# Patient Record
Sex: FEMALE | Race: WHITE | NOT HISPANIC OR LATINO | Employment: OTHER | URBAN - METROPOLITAN AREA
[De-identification: names, ages, dates, MRNs, and addresses within clinical notes are randomized per-mention and may not be internally consistent; named-entity substitution may affect disease eponyms.]

---

## 2017-01-18 ENCOUNTER — HOSPITAL ENCOUNTER (OUTPATIENT)
Dept: BONE DENSITY | Facility: HOSPITAL | Age: 72
Discharge: HOME | End: 2017-01-18
Attending: FAMILY MEDICINE

## 2017-01-18 DIAGNOSIS — Z78.0 POST-MENOPAUSAL: ICD-10-CM

## 2017-01-18 DIAGNOSIS — M85.80 OSTEOPENIA: ICD-10-CM

## 2018-04-24 ENCOUNTER — RECORDS - HEALTHEAST (OUTPATIENT)
Dept: LAB | Facility: CLINIC | Age: 73
End: 2018-04-24

## 2018-04-24 LAB
ALBUMIN SERPL-MCNC: 3.5 G/DL (ref 3.5–5)
ANION GAP SERPL CALCULATED.3IONS-SCNC: 9 MMOL/L (ref 5–18)
BUN SERPL-MCNC: 15 MG/DL (ref 8–28)
CALCIUM SERPL-MCNC: 9.7 MG/DL (ref 8.5–10.5)
CHLORIDE BLD-SCNC: 107 MMOL/L (ref 98–107)
CO2 SERPL-SCNC: 22 MMOL/L (ref 22–31)
CREAT SERPL-MCNC: 0.9 MG/DL (ref 0.6–1.1)
GFR SERPL CREATININE-BSD FRML MDRD: >60 ML/MIN/1.73M2
GLUCOSE BLD-MCNC: 90 MG/DL (ref 70–125)
PHOSPHATE SERPL-MCNC: 2.8 MG/DL (ref 2.5–4.5)
POTASSIUM BLD-SCNC: 4.4 MMOL/L (ref 3.5–5)
SODIUM SERPL-SCNC: 138 MMOL/L (ref 136–145)

## 2018-07-24 ENCOUNTER — HOSPITAL ENCOUNTER (OUTPATIENT)
Dept: MAMMOGRAPHY | Facility: CLINIC | Age: 73
Discharge: HOME | End: 2018-07-24
Attending: FAMILY MEDICINE

## 2018-07-24 DIAGNOSIS — Z12.31 VISIT FOR SCREENING MAMMOGRAM: ICD-10-CM

## 2018-12-07 ENCOUNTER — RECORDS - HEALTHEAST (OUTPATIENT)
Dept: LAB | Facility: CLINIC | Age: 73
End: 2018-12-07

## 2018-12-07 LAB
ALBUMIN SERPL-MCNC: 3.6 G/DL (ref 3.5–5)
ANION GAP SERPL CALCULATED.3IONS-SCNC: 10 MMOL/L (ref 5–18)
BUN SERPL-MCNC: 15 MG/DL (ref 8–28)
CALCIUM SERPL-MCNC: 9.7 MG/DL (ref 8.5–10.5)
CHLORIDE BLD-SCNC: 109 MMOL/L (ref 98–107)
CO2 SERPL-SCNC: 21 MMOL/L (ref 22–31)
CREAT SERPL-MCNC: 0.85 MG/DL (ref 0.6–1.1)
GFR SERPL CREATININE-BSD FRML MDRD: >60 ML/MIN/1.73M2
GLUCOSE BLD-MCNC: 94 MG/DL (ref 70–125)
PHOSPHATE SERPL-MCNC: 2.8 MG/DL (ref 2.5–4.5)
POTASSIUM BLD-SCNC: 4.2 MMOL/L (ref 3.5–5)
SODIUM SERPL-SCNC: 140 MMOL/L (ref 136–145)

## 2020-05-20 ASSESSMENT — MIFFLIN-ST. JEOR
SCORE: 1036.08
SCORE: 1008.51

## 2020-05-21 ENCOUNTER — SURGERY - HEALTHEAST (OUTPATIENT)
Dept: SURGERY | Facility: HOSPITAL | Age: 75
End: 2020-05-21

## 2020-05-21 ENCOUNTER — ANESTHESIA - HEALTHEAST (OUTPATIENT)
Dept: SURGERY | Facility: HOSPITAL | Age: 75
End: 2020-05-21

## 2020-05-21 ASSESSMENT — MIFFLIN-ST. JEOR: SCORE: 1036.08

## 2020-06-02 ENCOUNTER — COMMUNICATION - HEALTHEAST (OUTPATIENT)
Dept: SCHEDULING | Facility: CLINIC | Age: 75
End: 2020-06-02

## 2020-06-02 DIAGNOSIS — Z11.59 SCREENING FOR VIRAL DISEASE: ICD-10-CM

## 2020-06-04 ENCOUNTER — AMBULATORY - HEALTHEAST (OUTPATIENT)
Dept: LAB | Facility: CLINIC | Age: 75
End: 2020-06-04

## 2020-06-04 DIAGNOSIS — Z11.59 SCREENING FOR VIRAL DISEASE: ICD-10-CM

## 2020-06-06 ENCOUNTER — COMMUNICATION - HEALTHEAST (OUTPATIENT)
Dept: LAB | Facility: CLINIC | Age: 75
End: 2020-06-06

## 2020-06-08 ENCOUNTER — OFFICE VISIT - HEALTHEAST (OUTPATIENT)
Dept: SURGERY | Facility: CLINIC | Age: 75
End: 2020-06-08

## 2020-06-08 DIAGNOSIS — Z48.89 POSTOPERATIVE VISIT: ICD-10-CM

## 2021-02-04 ENCOUNTER — AMBULATORY - HEALTHEAST (OUTPATIENT)
Dept: NURSING | Facility: CLINIC | Age: 76
End: 2021-02-04

## 2021-02-25 ENCOUNTER — AMBULATORY - HEALTHEAST (OUTPATIENT)
Dept: NURSING | Facility: CLINIC | Age: 76
End: 2021-02-25

## 2021-05-25 ENCOUNTER — RECORDS - HEALTHEAST (OUTPATIENT)
Dept: ADMINISTRATIVE | Facility: CLINIC | Age: 76
End: 2021-05-25

## 2021-05-27 ENCOUNTER — RECORDS - HEALTHEAST (OUTPATIENT)
Dept: ADMINISTRATIVE | Facility: CLINIC | Age: 76
End: 2021-05-27

## 2021-05-28 ENCOUNTER — RECORDS - HEALTHEAST (OUTPATIENT)
Dept: ADMINISTRATIVE | Facility: CLINIC | Age: 76
End: 2021-05-28

## 2021-05-30 ENCOUNTER — RECORDS - HEALTHEAST (OUTPATIENT)
Dept: ADMINISTRATIVE | Facility: CLINIC | Age: 76
End: 2021-05-30

## 2021-06-02 ENCOUNTER — RECORDS - HEALTHEAST (OUTPATIENT)
Dept: ADMINISTRATIVE | Facility: CLINIC | Age: 76
End: 2021-06-02

## 2021-06-04 VITALS — WEIGHT: 140.1 LBS | HEIGHT: 62 IN | BODY MASS INDEX: 25.78 KG/M2

## 2021-06-05 ENCOUNTER — RECORDS - HEALTHEAST (OUTPATIENT)
Dept: SCHEDULING | Facility: CLINIC | Age: 76
End: 2021-06-05

## 2021-06-05 DIAGNOSIS — Z87.39: ICD-10-CM

## 2021-06-08 NOTE — ANESTHESIA POSTPROCEDURE EVALUATION
Patient: Mary J Bruton  Procedure(s):  EXPLORATORY LAPAROTOMY WITH LYSIS OF ADHESIONS  Anesthesia type: general    Patient location: PACU  Last vitals:   Vitals Value Taken Time   /79 5/21/2020  3:10 AM   Temp  5/21/2020  3:20 AM   Pulse 99 5/21/2020  3:19 AM   Resp 16 5/21/2020  3:19 AM   SpO2 98 % 5/21/2020  3:19 AM   Vitals shown include unvalidated device data.  Post vital signs: stable  Level of consciousness: awake and responds to simple questions  Post-anesthesia pain: pain controlled  Post-anesthesia nausea and vomiting: no  Pulmonary: unassisted, return to baseline  Cardiovascular: stable and blood pressure at baseline  Hydration: adequate  Anesthetic events: no    QCDR Measures:  ASA# 11 - Randee-op Cardiac Arrest: ASA11B - Patient did NOT experience unanticipated cardiac arrest  ASA# 12 - Randee-op Mortality Rate: ASA12B - Patient did NOT die  ASA# 13 - PACU Re-Intubation Rate: ASA13B - Patient did NOT require a new airway mgmt  ASA# 10 - Composite Anes Safety: ASA10A - No serious adverse event    Additional Notes:

## 2021-06-08 NOTE — PROGRESS NOTES
HPI: Pt is here for follow up exploratory laparotomy with KETURAH with Dr. George on 5/21/2020.   she is doing well.  Pain is well controlled.  No difficulties with the surgical wound/wounds.  she is eating well and denies fever and chills.         There were no vitals taken for this visit.    EXAM:  GENERAL:Appears well  ABDOMEN:  Soft, +BS  SURGICAL WOUNDS:  Incisions healing well, no enduration or drainage. Staples removed without incident. Patient tolerated well      Assessment/Plan: Doing well after surgery and should follow up as needed.    Gabriela Jacobson , [facility]

## 2021-06-08 NOTE — ANESTHESIA CARE TRANSFER NOTE
Last vitals:   Vitals:    05/21/20 0300   BP: 171/84   Pulse: 95   Resp: 21   Temp:    SpO2: 99%     Patient's level of consciousness is awake  Spontaneous respirations: yes  Maintains airway independently: yes  Dentition unchanged: yes  Oropharynx: oropharynx clear of all foreign objects    QCDR Measures:  ASA# 20 - Surgical Safety Checklist: WHO surgical safety checklist completed prior to induction    PQRS# 430 - Adult PONV Prevention: 4558F - Pt received => 2 anti-emetic agents (different classes) preop & intraop  ASA# 8 - Peds PONV Prevention: NA - Not pediatric patient, not GA or 2 or more risk factors NOT present  PQRS# 424 - Randee-op Temp Management: 4559F - At least one body temp DOCUMENTED => 35.5C or 95.9F within required timeframe  PQRS# 426 - PACU Transfer Protocol: - Transfer of care checklist used  ASA# 14 - Acute Post-op Pain: ASA14A - Patient experienced pain >= 7 out of 10

## 2021-06-08 NOTE — TELEPHONE ENCOUNTER
"Who is calling:  Patient   Reason for Call:  Suspected covid  Date of last appointment with primary care:   Okay to leave a detailed message: Yes    Patient is calling requesting COVID serologic antibody testing.  NOTE: Serologic testing is a blood test for 'antibodies' which are made at 10-14 days after you have had symptoms of COVID or were exposed and had an asymptomatic infection.  This does NOT test you for 'active' infection or tell you if you are contagious.    Are you a healthcare worker?  No  Do you currently have a cough, fever, body aches, shortness of breath or difficulty breathing?   No  Did you previously have cough, fever, body aches, shortness of breath, or difficulty breathing that have now resolved? Has had previous covid symptoms.   Symptoms began 120 days ago.  Symptoms started > 14 days ago. Lab order placed per SARS-CoV-2 Serology test Standing Order using indication \"Previously symptomatic >14d since onset, currently asymptomatic\" and diagnosis code \"Screening for viral disease\" (Z11.59)          The patient was informed: \"Testing is limited each day and it may take time for testing to be available to everyone who has called. You will receive a call within 48-72 hours to schedule the serology testing. Please confirm the best number to reach you is [phone]. If you have any questions about scheduling, call [phone].\"     "

## 2021-06-08 NOTE — ANESTHESIA PREPROCEDURE EVALUATION
Anesthesia Evaluation      Patient summary reviewed   No history of anesthetic complications     Airway    Pulmonary - negative ROS                          Cardiovascular - negative ROS   Neuro/Psych - negative ROS     Endo/Other - negative ROS      GI/Hepatic/Renal - negative ROS      Other findings: Results for BRUTON, MARY J (MRN [id number]) as of 5/21/2020 01:05    5/21/2020 00:17  Sodium: 141  Potassium: 4.4  Chloride: 109 (H)  CO2: 21 (L)  Anion Gap, Calculation: 11  BUN: 16  Creatinine: 0.79  GFR MDRD Af Amer: >60  GFR MDRD Non Af Amer: >60  Calcium: 9.3  Magnesium: 1.7 (L)  Glucose: 161 (H)  Lactic Acid: 3.1 (H)  Results for BRUTON, MARY J (MRN [id number]) as of 5/21/2020 01:05    5/21/2020 00:17  INR: 1.02  WBC: 24.1 (H)  RBC: 5.23  Hemoglobin: 15.7  Hematocrit: 46.7  MCV: 89  MCH: 30.0  MCHC: 33.6  RDW: 12.8  Platelets: 294        Dental                         Anesthesia Plan  Planned anesthetic: general endotracheal  GAETT  RSI  Will need to treat as COVID PUI as no COVID test performed  Antiemetics  2 IVs  Magnesium IV intra op  Ketamine after induction  Need to check pre op ECG  ASA 2 - emergent   Induction: intravenous   Anesthetic plan and risks discussed with: patient  Anesthesia plan special considerations: rapid sequence induction, antiemetics,   Post-op plan: routine recovery

## 2021-06-16 PROBLEM — K56.609 SBO (SMALL BOWEL OBSTRUCTION) (H): Status: ACTIVE | Noted: 2020-05-20

## 2021-06-20 ENCOUNTER — HEALTH MAINTENANCE LETTER (OUTPATIENT)
Age: 76
End: 2021-06-20

## 2021-06-20 NOTE — LETTER
Letter by Eugenie Jack RN at      Author: Euegnie Jack RN Service: -- Author Type: --    Filed:  Encounter Date: 6/6/2020 Status: (Other)       6/6/2020        Mary J Bruton  [address] [address]    COVID-19 Antibody Screen   Date Value Ref Range Status   06/04/2020 Negative  Final     Comment:     No COVID-19 antibodies detected.  Patients within 10 days of symptom onset for  COVID-19 may not produce sufficient levels of detectable antibodies.  Immunocompromised COVID-19 patients may take longer to develop antibodies.       You have tested NEGATIVE for COVID-19 antibodies. This suggests you have not had or been exposed to COVID-19. But it does not mean that for sure.    The test finds antibodies in most people 10 days after they get sick. For some people, it takes longer than 10 days for antibodies to show up. Others may never show antibodies against COVID-19, especially if they have weak immune systems.    If you have COVID-19 symptoms now, please stay home and away from others.     Your current symptoms may or may not be COVID-19.     What is antibody testing?  This is a kind of blood test. We take a small sample of your blood, and then test it for something called antibodies.   Your body makes antibodies to fight infection. If your blood has antibodies for a certain germ, it means youve been infected with that germ in the past.   Sometimes, antibodies stay in your body for years after youve had the infection. They can be there even if the germ didnt make you sick. They are a sign that your body fought off the infection.  Will this test find antibodies in everyone whos had COVID-19?  No. The test finds antibodies in most people 10 days after they get sick. For some people, it takes longer than 10 days for antibodies to show up. Others may never show antibodies against COVID-19, especially if they have weak immune systems.  What are the signs of COVID-19?  Signs of COVID-19 can appear from  2 to 14 days (up to 2 weeks) after youre infected. Some people have no symptoms or only mild symptoms. Others get very sick. The most common symptoms are:      Cough    Shortness of breath or trouble breathing    Or at least 2 of these symptoms:      Fever    Chills    Repeated shaking with chills    Muscle pain    Headache    Sore throat    Losing your sense of taste or smell    You may have other symptoms. Please contact your doctor or clinic for any symptoms that worry you.    Where can I get more information?     To learn the [facility] guidelines for staying home, please visit the [place] Department of Health website at [email or web address]/[email or web address].html    To learn more about COVID-19 and how to care for yourself at home, please visit the CDC website at https://www.cdc.gov/coronavirus/2019-ncov/about/steps-when-sick.html    For more options for care at [facility], please visit our website at [email or web address]    [facility] ([facility]) COVID-19 Hotline:  [phone]

## 2021-07-13 ENCOUNTER — RECORDS - HEALTHEAST (OUTPATIENT)
Dept: ADMINISTRATIVE | Facility: CLINIC | Age: 76
End: 2021-07-13

## 2021-09-21 ENCOUNTER — NURSE TRIAGE (OUTPATIENT)
Dept: NURSING | Facility: CLINIC | Age: 76
End: 2021-09-21

## 2021-09-21 NOTE — TELEPHONE ENCOUNTER
Patient asking for new covid immunization card. Hers is not readable and got it at [facility].  She would have to go to clinic and present it and see if she can trade it for new card.    Baylee Greer RN  [facility] Nurse Advisor

## 2021-10-11 ENCOUNTER — HEALTH MAINTENANCE LETTER (OUTPATIENT)
Age: 76
End: 2021-10-11

## 2022-07-17 ENCOUNTER — HEALTH MAINTENANCE LETTER (OUTPATIENT)
Age: 77
End: 2022-07-17

## 2022-08-08 ENCOUNTER — TRANSCRIBE ORDERS (OUTPATIENT)
Dept: OTHER | Age: 77
End: 2022-08-08

## 2022-08-08 ENCOUNTER — MEDICAL CORRESPONDENCE (OUTPATIENT)
Dept: HEALTH INFORMATION MANAGEMENT | Facility: CLINIC | Age: 77
End: 2022-08-08

## 2022-08-08 DIAGNOSIS — K43.2 INCISIONAL HERNIA, WITHOUT OBSTRUCTION OR GANGRENE: Primary | ICD-10-CM

## 2022-08-19 ENCOUNTER — OFFICE VISIT (OUTPATIENT)
Dept: SURGERY | Facility: CLINIC | Age: 77
End: 2022-08-19
Payer: COMMERCIAL

## 2022-08-19 VITALS — WEIGHT: 130.5 LBS | HEIGHT: 62 IN | BODY MASS INDEX: 24.01 KG/M2

## 2022-08-19 DIAGNOSIS — K43.2 INCISIONAL HERNIA, WITHOUT OBSTRUCTION OR GANGRENE: ICD-10-CM

## 2022-08-19 PROCEDURE — 99213 OFFICE O/P EST LOW 20 MIN: CPT | Performed by: SPECIALIST

## 2022-08-19 RX ORDER — CEFAZOLIN SODIUM/WATER 2 G/20 ML
2 SYRINGE (ML) INTRAVENOUS
Status: CANCELLED | OUTPATIENT
Start: 2022-10-27

## 2022-08-19 RX ORDER — CEFAZOLIN SODIUM/WATER 2 G/20 ML
2 SYRINGE (ML) INTRAVENOUS SEE ADMIN INSTRUCTIONS
Status: CANCELLED | OUTPATIENT
Start: 2022-10-27

## 2022-08-19 RX ORDER — LOSARTAN POTASSIUM 50 MG/1
50 TABLET ORAL DAILY
COMMUNITY
Start: 2022-06-27

## 2022-08-19 RX ORDER — ACETAMINOPHEN 325 MG/1
975 TABLET ORAL ONCE
Status: CANCELLED | OUTPATIENT
Start: 2022-10-27 | End: 2022-08-19

## 2022-08-19 RX ORDER — SPIRONOLACTONE 25 MG/1
50 TABLET ORAL DAILY
COMMUNITY
Start: 2022-08-13

## 2022-08-19 NOTE — LETTER
"    8/19/2022         RE: Mary J Bruton  [address] [address]        Dear Colleague,    Thank you for referring your patient, Mary J Bruton, to the [facility] [place]. Please see a copy of my visit note below.          HPI:  This is a 77 year old female here today with concerns of pain and bulging in her ventral and incisional area. She has noted this for the past a a few  month. The symptoms have progressed and increased over this time. She comes in for evaluation secondary to the hernia causing enough issues to bother them with daily activities or chores.    Allergies:Atenolol and Lisinopril    History reviewed. No pertinent past medical history.    Past Surgical History:   Procedure Laterality Date     LAPAROTOMY EXPLORATORY N/A 5/21/2020    Procedure: EXPLORATORY LAPAROTOMY WITH LYSIS OF ADHESIONS;  Surgeon: Roman George MD;  Location: [facility];  Service: General       CURRENT MEDS:      History reviewed. No pertinent family history.     reports that she has quit smoking. She has never used smokeless tobacco. She reports current alcohol use of about 1.0 standard drink of alcohol per week. She reports that she does not use drugs.    Review of Systems - Negative except history of exoplore surgery in the past. Otherwise twelve system of review is negative.      Vitals:    08/19/22 1332   Weight: 59.2 kg (130 lb 8 oz)   Height: 1.575 m (5' 2\")       Body mass index is 23.87 kg/m .    EXAM:  General: NAD   HEENT: normocephalic, PERRLA and EOMS intact  Mounth: Mucus membranes moist  Neck: Supple  Chest: Clear to auscultation bilaterally  CV: RRR  ABD: Soft nontender and nondistended, ventral hernia and incisional hernia, reducible  EXT: Warm, pulses intact,   Neuro: Alert and oriented x3  Back: no CVA tenderness      IMAGES:     none    Assessment/Plan: Pt with a ventral hernia and incisional hernia. I discussed this at length with She.  I " went over conservative management as well as surgical treatment of this.. I would plan on doing this via anrobotic  approach with possible use of mesh. I went over the small risks of surgery including but not limited to bleeding and infection, anesthesia, recurrence rates and nerve injury. I discussed the expected recovery time as well. Will get this scheduled. She will contact us to have this scheduled.      Roman George MD ,MD Roman George MD  General Surgery [phone]  Vascular Surgery [phone]          Again, thank you for allowing me to participate in the care of your patient.        Sincerely,        Roman George MD

## 2022-08-29 NOTE — PROGRESS NOTES
"      HPI:  This is a 77 year old female here today with concerns of pain and bulging in her ventral and incisional area. She has noted this for the past a a few  month. The symptoms have progressed and increased over this time. She comes in for evaluation secondary to the hernia causing enough issues to bother them with daily activities or chores.    Allergies:Atenolol and Lisinopril    History reviewed. No pertinent past medical history.    Past Surgical History:   Procedure Laterality Date     LAPAROTOMY EXPLORATORY N/A 5/21/2020    Procedure: EXPLORATORY LAPAROTOMY WITH LYSIS OF ADHESIONS;  Surgeon: Roman George MD;  [facility];  Service: General       CURRENT MEDS:      History reviewed. No pertinent family history.     reports that she has quit smoking. She has never used smokeless tobacco. She reports current alcohol use of about 1.0 standard drink of alcohol per week. She reports that she does not use drugs.    Review of Systems - Negative except history of exoplore surgery in the past. Otherwise twelve system of review is negative.      Vitals:    08/19/22 1332   Weight: 59.2 kg (130 lb 8 oz)   Height: 1.575 m (5' 2\")       Body mass index is 23.87 kg/m .    EXAM:  General: NAD   HEENT: normocephalic, PERRLA and EOMS intact  Mounth: Mucus membranes moist  Neck: Supple  Chest: Clear to auscultation bilaterally  CV: RRR  ABD: Soft nontender and nondistended, ventral hernia and incisional hernia, reducible  EXT: Warm, pulses intact,   Neuro: Alert and oriented x3  Back: no CVA tenderness      IMAGES:     none    Assessment/Plan: Pt with a ventral hernia and incisional hernia. I discussed this at length with She.  I went over conservative management as well as surgical treatment of this.. I would plan on doing this via anrobotic  approach with possible use of mesh. I went over the small risks of surgery including but not limited to bleeding and infection, anesthesia, recurrence rates and " nerve injury. I discussed the expected recovery time as well. Will get this scheduled. She will contact us to have this scheduled.      Roman George MD ,MD Roman George MD  General Surgery [phone]  Vascular Surgery [phone]

## 2022-09-25 ENCOUNTER — HEALTH MAINTENANCE LETTER (OUTPATIENT)
Age: 77
End: 2022-09-25

## 2022-10-17 ENCOUNTER — TRANSFERRED RECORDS (OUTPATIENT)
Dept: HEALTH INFORMATION MANAGEMENT | Facility: CLINIC | Age: 77
End: 2022-10-17

## 2022-10-18 ENCOUNTER — TRANSFERRED RECORDS (OUTPATIENT)
Dept: HEALTH INFORMATION MANAGEMENT | Facility: CLINIC | Age: 77
End: 2022-10-18

## 2022-10-27 ENCOUNTER — HOSPITAL ENCOUNTER (OUTPATIENT)
Facility: HOSPITAL | Age: 77
Discharge: HOME | End: 2022-10-27
Attending: SPECIALIST | Admitting: SPECIALIST
Payer: COMMERCIAL

## 2022-10-27 ENCOUNTER — ANESTHESIA EVENT (OUTPATIENT)
Dept: SURGERY | Facility: HOSPITAL | Age: 77
End: 2022-10-27
Payer: COMMERCIAL

## 2022-10-27 ENCOUNTER — ANESTHESIA (OUTPATIENT)
Dept: SURGERY | Facility: HOSPITAL | Age: 77
End: 2022-10-27
Payer: COMMERCIAL

## 2022-10-27 VITALS
HEIGHT: 62 IN | SYSTOLIC BLOOD PRESSURE: 126 MMHG | HEART RATE: 64 BPM | BODY MASS INDEX: 24.29 KG/M2 | WEIGHT: 132 LBS | RESPIRATION RATE: 9 BRPM | DIASTOLIC BLOOD PRESSURE: 62 MMHG | OXYGEN SATURATION: 92 % | TEMPERATURE: 98.5 F

## 2022-10-27 DIAGNOSIS — K43.9 VENTRAL HERNIA WITHOUT OBSTRUCTION OR GANGRENE: Primary | ICD-10-CM

## 2022-10-27 PROCEDURE — 250N000013 HC RX MED GY IP 250 OP 250 PS 637: Performed by: SPECIALIST

## 2022-10-27 PROCEDURE — 250N000009 HC RX 250: Performed by: ANESTHESIOLOGY

## 2022-10-27 PROCEDURE — 250N000013 HC RX MED GY IP 250 OP 250 PS 637: Performed by: ANESTHESIOLOGY

## 2022-10-27 PROCEDURE — 250N000011 HC RX IP 250 OP 636

## 2022-10-27 PROCEDURE — 272N000001 HC OR GENERAL SUPPLY STERILE: Performed by: SPECIALIST

## 2022-10-27 PROCEDURE — C1781 MESH (IMPLANTABLE): HCPCS | Performed by: SPECIALIST

## 2022-10-27 PROCEDURE — 2894A PR VOIDCORRECT: CPT | Performed by: SPECIALIST

## 2022-10-27 PROCEDURE — 250N000011 HC RX IP 250 OP 636: Performed by: ANESTHESIOLOGY

## 2022-10-27 PROCEDURE — 250N000011 HC RX IP 250 OP 636: Performed by: SPECIALIST

## 2022-10-27 PROCEDURE — S2900 ROBOTIC SURGICAL SYSTEM: HCPCS | Performed by: SPECIALIST

## 2022-10-27 PROCEDURE — 250N000025 HC SEVOFLURANE, PER MIN: Performed by: SPECIALIST

## 2022-10-27 PROCEDURE — 250N000009 HC RX 250

## 2022-10-27 PROCEDURE — 258N000003 HC RX IP 258 OP 636: Performed by: ANESTHESIOLOGY

## 2022-10-27 PROCEDURE — 49654 PR LAP INCISIONAL HERNIA REPAIR: CPT | Mod: 51 | Performed by: SPECIALIST

## 2022-10-27 PROCEDURE — 999N000141 HC STATISTIC PRE-PROCEDURE NURSING ASSESSMENT: Performed by: SPECIALIST

## 2022-10-27 PROCEDURE — 710N000009 HC RECOVERY PHASE 1, LEVEL 1, PER MIN: Performed by: SPECIALIST

## 2022-10-27 PROCEDURE — 370N000017 HC ANESTHESIA TECHNICAL FEE, PER MIN: Performed by: SPECIALIST

## 2022-10-27 PROCEDURE — 710N000012 HC RECOVERY PHASE 2, PER MINUTE: Performed by: SPECIALIST

## 2022-10-27 PROCEDURE — 250N000009 HC RX 250: Performed by: SPECIALIST

## 2022-10-27 PROCEDURE — 360N000080 HC SURGERY LEVEL 7, PER MIN: Performed by: SPECIALIST

## 2022-10-27 DEVICE — SELF-GRIPPING POLYESTER MESH,POLYESTER WITH POLYLACTIC ACID GRIPS
Type: IMPLANTABLE DEVICE | Site: ABDOMEN | Status: FUNCTIONAL
Brand: PROGRIP

## 2022-10-27 RX ORDER — ACETAMINOPHEN 325 MG/1
975 TABLET ORAL ONCE
Status: COMPLETED | OUTPATIENT
Start: 2022-10-27 | End: 2022-10-27

## 2022-10-27 RX ORDER — OXYCODONE AND ACETAMINOPHEN 5; 325 MG/1; MG/1
1 TABLET ORAL EVERY 6 HOURS PRN
Qty: 12 TABLET | Refills: 0 | Status: SHIPPED | OUTPATIENT
Start: 2022-10-27 | End: 2022-10-30

## 2022-10-27 RX ORDER — FENTANYL CITRATE 50 UG/ML
25 INJECTION, SOLUTION INTRAMUSCULAR; INTRAVENOUS
Status: DISCONTINUED | OUTPATIENT
Start: 2022-10-27 | End: 2022-10-27 | Stop reason: HOSPADM

## 2022-10-27 RX ORDER — NALOXONE HYDROCHLORIDE 0.4 MG/ML
0.2 INJECTION, SOLUTION INTRAMUSCULAR; INTRAVENOUS; SUBCUTANEOUS
Status: DISCONTINUED | OUTPATIENT
Start: 2022-10-27 | End: 2022-10-27 | Stop reason: HOSPADM

## 2022-10-27 RX ORDER — BUPIVACAINE HYDROCHLORIDE 2.5 MG/ML
INJECTION, SOLUTION EPIDURAL; INFILTRATION; INTRACAUDAL
Status: DISCONTINUED | OUTPATIENT
Start: 2022-10-27 | End: 2022-10-27

## 2022-10-27 RX ORDER — ONDANSETRON 2 MG/ML
4 INJECTION INTRAMUSCULAR; INTRAVENOUS EVERY 30 MIN PRN
Status: DISCONTINUED | OUTPATIENT
Start: 2022-10-27 | End: 2022-10-27 | Stop reason: HOSPADM

## 2022-10-27 RX ORDER — SODIUM CHLORIDE, SODIUM LACTATE, POTASSIUM CHLORIDE, CALCIUM CHLORIDE 600; 310; 30; 20 MG/100ML; MG/100ML; MG/100ML; MG/100ML
INJECTION, SOLUTION INTRAVENOUS CONTINUOUS
Status: DISCONTINUED | OUTPATIENT
Start: 2022-10-27 | End: 2022-10-27 | Stop reason: HOSPADM

## 2022-10-27 RX ORDER — NALOXONE HYDROCHLORIDE 0.4 MG/ML
0.4 INJECTION, SOLUTION INTRAMUSCULAR; INTRAVENOUS; SUBCUTANEOUS
Status: DISCONTINUED | OUTPATIENT
Start: 2022-10-27 | End: 2022-10-27 | Stop reason: HOSPADM

## 2022-10-27 RX ORDER — FENTANYL CITRATE 50 UG/ML
INJECTION, SOLUTION INTRAMUSCULAR; INTRAVENOUS PRN
Status: DISCONTINUED | OUTPATIENT
Start: 2022-10-27 | End: 2022-10-27

## 2022-10-27 RX ORDER — DEXAMETHASONE SODIUM PHOSPHATE 10 MG/ML
INJECTION, SOLUTION INTRAMUSCULAR; INTRAVENOUS PRN
Status: DISCONTINUED | OUTPATIENT
Start: 2022-10-27 | End: 2022-10-27

## 2022-10-27 RX ORDER — ONDANSETRON 4 MG/1
4 TABLET, ORALLY DISINTEGRATING ORAL EVERY 30 MIN PRN
Status: DISCONTINUED | OUTPATIENT
Start: 2022-10-27 | End: 2022-10-27 | Stop reason: HOSPADM

## 2022-10-27 RX ORDER — PROPOFOL 10 MG/ML
INJECTION, EMULSION INTRAVENOUS CONTINUOUS PRN
Status: DISCONTINUED | OUTPATIENT
Start: 2022-10-27 | End: 2022-10-27

## 2022-10-27 RX ORDER — CEFAZOLIN SODIUM/WATER 2 G/20 ML
2 SYRINGE (ML) INTRAVENOUS
Status: COMPLETED | OUTPATIENT
Start: 2022-10-27 | End: 2022-10-27

## 2022-10-27 RX ORDER — LIDOCAINE 40 MG/G
CREAM TOPICAL
Status: DISCONTINUED | OUTPATIENT
Start: 2022-10-27 | End: 2022-10-27 | Stop reason: HOSPADM

## 2022-10-27 RX ORDER — GLYCOPYRROLATE 0.2 MG/ML
INJECTION, SOLUTION INTRAMUSCULAR; INTRAVENOUS PRN
Status: DISCONTINUED | OUTPATIENT
Start: 2022-10-27 | End: 2022-10-27

## 2022-10-27 RX ORDER — MEPERIDINE HYDROCHLORIDE 25 MG/ML
12.5 INJECTION INTRAMUSCULAR; INTRAVENOUS; SUBCUTANEOUS
Status: DISCONTINUED | OUTPATIENT
Start: 2022-10-27 | End: 2022-10-27 | Stop reason: HOSPADM

## 2022-10-27 RX ORDER — KETOROLAC TROMETHAMINE 30 MG/ML
15 INJECTION, SOLUTION INTRAMUSCULAR; INTRAVENOUS EVERY 6 HOURS PRN
Status: DISCONTINUED | OUTPATIENT
Start: 2022-10-27 | End: 2022-10-27 | Stop reason: HOSPADM

## 2022-10-27 RX ORDER — ONDANSETRON 2 MG/ML
INJECTION INTRAMUSCULAR; INTRAVENOUS PRN
Status: DISCONTINUED | OUTPATIENT
Start: 2022-10-27 | End: 2022-10-27

## 2022-10-27 RX ORDER — PROPOFOL 10 MG/ML
INJECTION, EMULSION INTRAVENOUS PRN
Status: DISCONTINUED | OUTPATIENT
Start: 2022-10-27 | End: 2022-10-27

## 2022-10-27 RX ORDER — CEFAZOLIN SODIUM/WATER 2 G/20 ML
2 SYRINGE (ML) INTRAVENOUS SEE ADMIN INSTRUCTIONS
Status: DISCONTINUED | OUTPATIENT
Start: 2022-10-27 | End: 2022-10-27 | Stop reason: HOSPADM

## 2022-10-27 RX ORDER — FENTANYL CITRATE 50 UG/ML
25 INJECTION, SOLUTION INTRAMUSCULAR; INTRAVENOUS EVERY 5 MIN PRN
Status: DISCONTINUED | OUTPATIENT
Start: 2022-10-27 | End: 2022-10-27 | Stop reason: HOSPADM

## 2022-10-27 RX ORDER — OXYCODONE HYDROCHLORIDE 5 MG/1
5 TABLET ORAL EVERY 4 HOURS PRN
Status: DISCONTINUED | OUTPATIENT
Start: 2022-10-27 | End: 2022-10-27 | Stop reason: HOSPADM

## 2022-10-27 RX ADMIN — ONDANSETRON 4 MG: 2 INJECTION INTRAMUSCULAR; INTRAVENOUS at 12:29

## 2022-10-27 RX ADMIN — ROCURONIUM BROMIDE 10 MG: 50 INJECTION, SOLUTION INTRAVENOUS at 11:15

## 2022-10-27 RX ADMIN — ROCURONIUM BROMIDE 20 MG: 50 INJECTION, SOLUTION INTRAVENOUS at 11:58

## 2022-10-27 RX ADMIN — HYDROMORPHONE HYDROCHLORIDE 0.2 MG: 1 INJECTION, SOLUTION INTRAMUSCULAR; INTRAVENOUS; SUBCUTANEOUS at 13:35

## 2022-10-27 RX ADMIN — PROPOFOL 40 MCG/KG/MIN: 10 INJECTION, EMULSION INTRAVENOUS at 11:08

## 2022-10-27 RX ADMIN — HYDROMORPHONE HYDROCHLORIDE 0.2 MG: 1 INJECTION, SOLUTION INTRAMUSCULAR; INTRAVENOUS; SUBCUTANEOUS at 13:50

## 2022-10-27 RX ADMIN — FENTANYL CITRATE 25 MCG: 50 INJECTION, SOLUTION INTRAMUSCULAR; INTRAVENOUS at 12:09

## 2022-10-27 RX ADMIN — SODIUM CHLORIDE, POTASSIUM CHLORIDE, SODIUM LACTATE AND CALCIUM CHLORIDE: 600; 310; 30; 20 INJECTION, SOLUTION INTRAVENOUS at 11:10

## 2022-10-27 RX ADMIN — GLYCOPYRROLATE 0.2 MG: 0.2 INJECTION, SOLUTION INTRAMUSCULAR; INTRAVENOUS at 10:07

## 2022-10-27 RX ADMIN — HYDROMORPHONE HYDROCHLORIDE 0.2 MG: 1 INJECTION, SOLUTION INTRAMUSCULAR; INTRAVENOUS; SUBCUTANEOUS at 14:00

## 2022-10-27 RX ADMIN — FENTANYL CITRATE 50 MCG: 50 INJECTION, SOLUTION INTRAMUSCULAR; INTRAVENOUS at 12:56

## 2022-10-27 RX ADMIN — FENTANYL CITRATE 25 MCG: 50 INJECTION, SOLUTION INTRAMUSCULAR; INTRAVENOUS at 12:21

## 2022-10-27 RX ADMIN — SODIUM CHLORIDE, POTASSIUM CHLORIDE, SODIUM LACTATE AND CALCIUM CHLORIDE: 600; 310; 30; 20 INJECTION, SOLUTION INTRAVENOUS at 09:20

## 2022-10-27 RX ADMIN — Medication 2 G: at 10:04

## 2022-10-27 RX ADMIN — PROPOFOL 160 MG: 10 INJECTION, EMULSION INTRAVENOUS at 10:07

## 2022-10-27 RX ADMIN — ACETAMINOPHEN 975 MG: 325 TABLET, FILM COATED ORAL at 08:49

## 2022-10-27 RX ADMIN — HYDROMORPHONE HYDROCHLORIDE 0.25 MG: 1 INJECTION, SOLUTION INTRAMUSCULAR; INTRAVENOUS; SUBCUTANEOUS at 10:31

## 2022-10-27 RX ADMIN — GLYCOPYRROLATE 0.1 MG: 0.2 INJECTION, SOLUTION INTRAMUSCULAR; INTRAVENOUS at 12:31

## 2022-10-27 RX ADMIN — FENTANYL CITRATE 50 MCG: 50 INJECTION, SOLUTION INTRAMUSCULAR; INTRAVENOUS at 10:07

## 2022-10-27 RX ADMIN — KETOROLAC TROMETHAMINE 15 MG: 30 INJECTION, SOLUTION INTRAMUSCULAR at 14:46

## 2022-10-27 RX ADMIN — HYDROMORPHONE HYDROCHLORIDE 0.25 MG: 1 INJECTION, SOLUTION INTRAMUSCULAR; INTRAVENOUS; SUBCUTANEOUS at 12:58

## 2022-10-27 RX ADMIN — HYDROMORPHONE HYDROCHLORIDE 0.25 MG: 1 INJECTION, SOLUTION INTRAMUSCULAR; INTRAVENOUS; SUBCUTANEOUS at 10:46

## 2022-10-27 RX ADMIN — BUPIVACAINE HYDROCHLORIDE 30 ML: 2.5 INJECTION, SOLUTION EPIDURAL; INFILTRATION; INTRACAUDAL at 10:10

## 2022-10-27 RX ADMIN — ROCURONIUM BROMIDE 10 MG: 50 INJECTION, SOLUTION INTRAVENOUS at 10:45

## 2022-10-27 RX ADMIN — SUGAMMADEX 120 MG: 100 INJECTION, SOLUTION INTRAVENOUS at 12:36

## 2022-10-27 RX ADMIN — Medication 100 MG: at 10:07

## 2022-10-27 RX ADMIN — HYDROMORPHONE HYDROCHLORIDE 0.2 MG: 1 INJECTION, SOLUTION INTRAMUSCULAR; INTRAVENOUS; SUBCUTANEOUS at 13:17

## 2022-10-27 RX ADMIN — SODIUM CHLORIDE, POTASSIUM CHLORIDE, SODIUM LACTATE AND CALCIUM CHLORIDE: 600; 310; 30; 20 INJECTION, SOLUTION INTRAVENOUS at 13:34

## 2022-10-27 RX ADMIN — ACETAMINOPHEN 975 MG: 325 TABLET, FILM COATED ORAL at 14:41

## 2022-10-27 RX ADMIN — DEXAMETHASONE SODIUM PHOSPHATE 4 MG: 10 INJECTION, SOLUTION INTRAMUSCULAR; INTRAVENOUS at 10:22

## 2022-10-27 RX ADMIN — FENTANYL CITRATE 50 MCG: 50 INJECTION, SOLUTION INTRAMUSCULAR; INTRAVENOUS at 10:17

## 2022-10-27 RX ADMIN — ROCURONIUM BROMIDE 30 MG: 50 INJECTION, SOLUTION INTRAVENOUS at 10:16

## 2022-10-27 ASSESSMENT — ACTIVITIES OF DAILY LIVING (ADL)
ADLS_ACUITY_SCORE: 35
ADLS_ACUITY_SCORE: 35
ADLS_ACUITY_SCORE: 37
ADLS_ACUITY_SCORE: 37
ADLS_ACUITY_SCORE: 35

## 2022-10-27 NOTE — ANESTHESIA CARE TRANSFER NOTE
Patient: Mary J Bruton    Procedure: Procedure(s):  HERNIORRHAPHY, VENTRAL, ROBOT-ASSISTED, LAPAROSCOPIC WITH MESH, USING DA JULIETTE XI       Diagnosis: Incisional hernia, without obstruction or gangrene [K43.2]  Diagnosis Additional Information: No value filed.    Anesthesia Type:   General     Note:    Oropharynx: oropharynx clear of all foreign objects and spontaneously breathing  Level of Consciousness: awake  Oxygen Supplementation: face mask  Level of Supplemental Oxygen (L/min / FiO2): 8  Independent Airway: airway patency satisfactory and stable  Dentition: dentition unchanged S/P dental procedure  Vital Signs Stable: post-procedure vital signs reviewed and stable    Patient transferred to: PACU  Comments: Patient reporting pain in PACU - additional Fentanyl 50mcg and Dilaudid 0.25mg IV given (see intra-op charting). BP recheck 163/75.  Handoff Report: Identifed the Patient, Identified the Reponsible Provider, Reviewed the pertinent medical history, Discussed the surgical course, Reviewed Intra-OP anesthesia mangement and issues during anesthesia, Set expectations for post-procedure period and Allowed opportunity for questions and acknowledgement of understanding      Vitals:  Vitals Value Taken Time   /109 10/27/22 1255   Temp 36.9  C (98.5  F) 10/27/22 1250   Pulse 81 10/27/22 1259   Resp 10 10/27/22 1259   SpO2 100 % 10/27/22 1259   Vitals shown include unvalidated device data.    Electronically Signed By: JOHNNY Green CRNA  October 27, 2022  1:00 PM

## 2022-10-27 NOTE — OP NOTE
Operative Note    Name:  Mary J Bruton  PCP:  Darian Mason  Procedure Date:  10/27/2022      Procedure(s):  HERNIORRHAPHY, VENTRAL, ROBOT-ASSISTED, LAPAROSCOPIC WITH MESH, USING DA JULIETTE XI    Pre-Procedure Diagnosis:  Incisional hernia, without obstruction or gangrene [K43.2]     Post-Procedure Diagnosis:    ventral incisional hernia    OR staff:  Surgeon(s):  Roman George MD  Circulator: Ai Navarro RN; Daya Strauss RN  Relief Scrub: Tram Lorenz  Scrub Person: Aster Ybarra      Anesthesia Type:  General with Block    Past Medical History:   Diagnosis Date     Benign essential hypertension      Complication of anesthesia      History of colonic polyps      Hypercholesterolemia      Osteopenia      Umbilical hernia without obstruction and without gangrene        Patient Active Problem List    Diagnosis Date Noted     Lactic acidosis      Priority: Medium     Dehydration      Priority: Medium     SBO (small bowel obstruction) (H) 05/20/2020     Priority: Medium       Findings:  Multiple small defects on midline    Operative Report:    Consent was obtained.  The patient was taken to the operating room and placed in a supine position.  General anesthesia was administered by the anesthesia staff.  The briefing and timeout were performed in the usual fashion.  The patient was prepped and draped in a sterile manner.  After a Snyder was placed.  A briefing and timeout was performed anesthesia and or staff.  Made incision inferior to the left subcostal margin Visiport was placed try to insufflate the space there is adhesions right to that area so I then made an incision in the opposite subcostal space 5 mm port 5 mm scope Visiport was used to gain access peritoneal cavity insufflated pressure 15 mmHg CO2.  I then could easily use a scope to 2 placed my 3 robotic trochars were all placed on the left lateral abdominal wall.  Under direct visualization we docked the robot and I changed my  position to the console.  At this time point I took down adhesions there is a lot of adhesions on the midline from the umbilicus upwards these were taken down sharply with dissection with the scissors and some blunt dissection I was able to reduce all the contents which had ended up being colon and omentum.  Once we got this all reduced we could see that there is about 4-5 small defects along the midline each about 3 cm in diameter to 2 cm in diameter and once I saw this we decided to for sure do a retrorectus pair repair.  I took down the retrorectus space on the lateral margin of the rectus sheath on the left lateral side.  I used double robot with sharp dissection electrocautery to create this plane we then took this to the to the medial part of the rectus sheath opened this up and then entered into the retroperitoneal space dissecting the hernia sacs completely intact along the whole length of the midline incision and hernia area.  This was done from almost the inferior to the xiphoid all the way down to the pubis.  Upon completion of this dissection I then entered the retrorectus sheath on the opposite side dissecting inferior to the muscle and creating this flap and plane all the way out lateral to the lateral rectus sheath.  So now with a nice dissection reduction of all hernias and I began then repair of the defect itself I sutured the defect inferior to the xiphoid right of the actual takeoff of the falciform ligament with an oh permanent V-Loc suture took this inferiorly all the way down to the midline on the midline inferior to the umbilicus and and fix the repair of the defect.  Upon completion of this we then measured the space is about 15 x 18 cm met which mesh was trimmed 2 cm off of 15 x 20cm ProGrip mesh.  This was placed intraperitoneally on rolled and placed in his position against the leg rectus muscles.  This covered over nicely or defect I then reapproximated my rectus sheath on the left  lateral side with a 2-0 absorbable V-Loc suture in a continuous fashion I have 1 small rent in the peritoneal in the hernia sac and this was closed also with an 2 oh V-Loc suture.  I removed the sutures needles.  Looked good trochars removed the right robot was undocked and then closed all incisions with 4 Monocryl subcuticular stitch.  Steri-Strips sterile dressings applied of note is the patient had a tap block done by anesthesia before abdominal prep.        Roman George MD  General Surgery [phone]  Vascular Surgery [phone]              Estimated Blood Loss:   15 ml    Specimens:    none       Drains:   none    Complications:    None    Roman George MD     Date: 10/27/2022  Time: 1:04 PM

## 2022-10-27 NOTE — ANESTHESIA PREPROCEDURE EVALUATION
Anesthesia Pre-Procedure Evaluation    Patient: Mary J Bruton   MRN: [id number] : 1945        Procedure : Procedure(s):  HERNIORRHAPHY, VENTRAL, ROBOT-ASSISTED, LAPAROSCOPIC, USING DA JULIETTE XI          Past Medical History:   Diagnosis Date     Benign essential hypertension      Complication of anesthesia      History of colonic polyps      Hypercholesterolemia      Osteopenia      Umbilical hernia without obstruction and without gangrene       Past Surgical History:   Procedure Laterality Date     LAPAROTOMY EXPLORATORY N/A 2020    Procedure: EXPLORATORY LAPAROTOMY WITH LYSIS OF ADHESIONS;  Surgeon: Roman George MD;  Location: [facility] OR;  Service: General      Allergies   Allergen Reactions     Atenolol Unknown     Lisinopril Unknown      Social History     Tobacco Use     Smoking status: Former     Smokeless tobacco: Never     Tobacco comments:     started at 15 years old, stopped at 22 years old   Substance Use Topics     Alcohol use: Yes     Alcohol/week: 1.0 standard drink     Types: 1 Standard drinks or equivalent per week     Comment: 1 glass wine a week      Wt Readings from Last 1 Encounters:   10/27/22 59.9 kg (132 lb)        Anesthesia Evaluation            ROS/MED HX  ENT/Pulmonary:  - neg pulmonary ROS     Neurologic:  - neg neurologic ROS     Cardiovascular:     (+) hypertension-----    METS/Exercise Tolerance:     Hematologic:  - neg hematologic  ROS     Musculoskeletal:  - neg musculoskeletal ROS     GI/Hepatic:  - neg GI/hepatic ROS     Renal/Genitourinary:  - neg Renal ROS     Endo:  - neg endo ROS     Psychiatric/Substance Use:  - neg psychiatric ROS     Infectious Disease:  - neg infectious disease ROS     Malignancy:  - neg malignancy ROS     Other:            Physical Exam    Airway        Mallampati: III   TM distance: > 3 FB   Neck ROM: full   Mouth opening: > 3 cm    Respiratory Devices and Support         Dental       (+) chipped      Cardiovascular    cardiovascular exam normal          Pulmonary   pulmonary exam normal                OUTSIDE LABS:  CBC:   Lab Results   Component Value Date    WBC 9.8 05/23/2020    WBC 10.5 05/22/2020    HGB 11.4 (L) 05/23/2020    HGB 11.3 (L) 05/22/2020    HCT 34.3 (L) 05/23/2020    HCT 35.3 05/22/2020     05/23/2020     05/22/2020     BMP:   Lab Results   Component Value Date     05/23/2020     05/22/2020    POTASSIUM 3.5 05/24/2020    POTASSIUM 3.6 05/23/2020    CHLORIDE 107 05/23/2020    CHLORIDE 110 (H) 05/22/2020    CO2 26 05/23/2020    CO2 25 05/22/2020    BUN 6 (L) 05/23/2020    BUN 10 05/22/2020    CR 0.68 05/23/2020    CR 0.69 05/22/2020     05/23/2020    GLC 82 05/22/2020     COAGS:   Lab Results   Component Value Date    INR 1.02 05/21/2020     POC: No results found for: BGM, HCG, HCGS  HEPATIC:   Lab Results   Component Value Date    ALBUMIN 3.6 12/07/2018     OTHER:   Lab Results   Component Value Date    LACT 2.0 05/21/2020    BARRERA 8.3 (L) 05/23/2020    PHOS 2.8 12/07/2018    MAG 2.0 05/24/2020       Anesthesia Plan    ASA Status:  3   NPO Status:  NPO Appropriate    Anesthesia Type: General.     - Airway: ETT   Induction: Intravenous, RSI, Propofol.   Maintenance: Balanced.        Consents    Anesthesia Plan(s) and associated risks, benefits, and realistic alternatives discussed. Questions answered and patient/representative(s) expressed understanding.     - Discussed: Risks, Benefits and Alternatives for BOTH SEDATION and the PROCEDURE were discussed     - Discussed with:  Patient      - Extended Intubation/Ventilatory Support Discussed: No.      - Patient is DNR/DNI Status: No    Use of blood products discussed: No .     Postoperative Care    Pain management: IV analgesics, Multi-modal analgesia, Peripheral nerve block (Single Shot).   PONV prophylaxis: Ondansetron (or other 5HT-3), Dexamethasone or Solumedrol     Comments:    Other Comments: GETA    Tap block req by  surgeon    Ele Clinton MD

## 2022-10-27 NOTE — ANESTHESIA PROCEDURE NOTES
Airway       Patient location during procedure: OR       Procedure Start/Stop Times: 10/27/2022 10:10 AM  Staff -        CRNA: Amie Araiza APRN CRNA       Performed By: CRNAIndications and Patient Condition       Indications for airway management: silvana-procedural       Induction type:intravenous       Mask difficulty assessment: 1 - vent by mask    Final Airway Details       Final airway type: endotracheal airway       Successful airway: ETT - single  Endotracheal Airway Details        ETT size (mm): 7.0       Cuffed: yes       Successful intubation technique: direct laryngoscopy       DL Blade Type: Putnam 2       Grade View of Cords: 1       Adjucts: stylet       Position: Right       Measured from: gums/teeth       Secured at (cm): 21       Bite block used: None    Post intubation assessment        Placement verified by: capnometry, equal breath sounds and chest rise        Number of attempts at approach: 2       Number of other approaches attempted: 0       Secured with: silk tape       Ease of procedure: easy       Dentition: Intact and Unchanged       Dental guard used and removed. Dental Guard Type: Proguard Red.    Medication(s) Administered   Medication Administration Time: 10/27/2022 10:10 AM

## 2022-10-27 NOTE — INTERVAL H&P NOTE
I have reviewed the surgical (or preoperative) H&P that is linked to this encounter, and examined the patient. There are no significant changes.        Roman George MD  General Surgery [phone]  Vascular Surgery [phone]          Clinical Conditions Present on Arrival:  Clinically Significant Risk Factors Present on Admission

## 2022-10-27 NOTE — ANESTHESIA POSTPROCEDURE EVALUATION
Patient: Mary J Bruton    Procedure: Procedure(s):  HERNIORRHAPHY, VENTRAL, ROBOT-ASSISTED, LAPAROSCOPIC WITH MESH, USING DA JULIETTE XI       Anesthesia Type:  General    Note:  Disposition: Outpatient   Postop Pain Control: Uneventful            Sign Out: Well controlled pain   PONV: No   Neuro/Psych: Uneventful            Sign Out: Acceptable/Baseline neuro status   Airway/Respiratory: Uneventful            Sign Out: Acceptable/Baseline resp. status   CV/Hemodynamics: Uneventful            Sign Out: Acceptable CV status; No obvious hypovolemia; No obvious fluid overload   Other NRE: NONE   DID A NON-ROUTINE EVENT OCCUR? No    Event details/Postop Comments:  No issues.             Last vitals:  Vitals Value Taken Time   /71 10/27/22 1415   Temp 36.9  C (98.5  F) 10/27/22 1415   Pulse 78 10/27/22 1415   Resp 11 10/27/22 1415   SpO2 93 % 10/27/22 1415       Electronically Signed By: Lolis Clinton MD  October 27, 2022  4:06 PM

## 2022-10-27 NOTE — ANESTHESIA PROCEDURE NOTES
TAP Procedure Note    Pre-Procedure   Staff -        Anesthesiologist:  Lolis Clinton MD       Performed By: anesthesiologist       Location: OR       Procedure Start/Stop Times: 10/27/2022 10:10 AM and 10/27/2022 10:12 AM       Pre-Anesthestic Checklist: patient identified, IV checked, site marked, risks and benefits discussed, informed consent, monitors and equipment checked, pre-op evaluation, at physician/surgeon's request and post-op pain management  Timeout:       Correct Patient: Yes        Correct Procedure: Yes        Correct Site: Yes        Correct Position: Yes        Correct Laterality: Yes        Site Marked: Yes  Procedure Documentation  Procedure: TAP       Laterality: bilateral       Patient Position: supine       Skin prep: Chloraprep       Needle type: 20G blunt stimuplex needle.       Ultrasound guided       1. Ultrasound was used to identify targeted nerve, plexus, vascular marker, or fascial plane and place a needle adjacent to it in real-time.       2. Ultrasound was used to visualize the spread of anesthetic in close proximity to the above referenced structure.       3. A permanent image is entered into the patient's record.       4. The visualized anatomic structures appeared normal.       5. There were no apparent abnormal pathologic findings.    Assessment/Narrative         The placement was negative for: blood aspirated, painful injection and site bleeding       Paresthesias: No.       Bolus given via needle..        Secured via.        Insertion/Infusion Method: Single Shot       Complications: none       Injection made incrementally with aspirations every 5 mL.    Medication(s) Administered   Bupivacaine 0.25% PF (Infiltration) - Infiltration   30 mL - 10/27/2022 10:10:00 AM  Medication Administration Time: 10/27/2022 10:10 AM     Comments:  No LAST.  VSS.      15 ml each side in increments      FOR [facility] ([facility]) ONLY:   Pain Team Contact information: please page the Pain Team  "Via [employer]. Search \"Pain\". During daytime hours, please page the attending first. At night please page the resident first.    "

## 2022-11-03 PROBLEM — K63.5 COLON POLYP: Status: ACTIVE | Noted: 2018-12-31

## 2022-11-03 PROBLEM — I10 HTN (HYPERTENSION): Status: ACTIVE | Noted: 2018-12-31

## 2022-11-03 PROBLEM — K57.30 DIVERTICULAR DISEASE OF LARGE INTESTINE: Status: ACTIVE | Noted: 2017-02-07

## 2022-11-03 PROBLEM — Z86.0100 HISTORY OF COLONIC POLYPS: Status: ACTIVE | Noted: 2017-02-10

## 2022-11-03 PROBLEM — K42.9 UMBILICAL HERNIA: Status: ACTIVE | Noted: 2021-09-23

## 2022-11-03 PROBLEM — M85.80 OSTEOPENIA: Status: ACTIVE | Noted: 2018-12-31

## 2022-11-11 ENCOUNTER — OFFICE VISIT (OUTPATIENT)
Dept: SURGERY | Facility: CLINIC | Age: 77
End: 2022-11-11
Payer: COMMERCIAL

## 2022-11-11 VITALS — DIASTOLIC BLOOD PRESSURE: 76 MMHG | SYSTOLIC BLOOD PRESSURE: 130 MMHG

## 2022-11-11 DIAGNOSIS — K43.2 INCISIONAL HERNIA, WITHOUT OBSTRUCTION OR GANGRENE: Primary | ICD-10-CM

## 2022-11-11 PROCEDURE — 99024 POSTOP FOLLOW-UP VISIT: CPT | Performed by: SPECIALIST

## 2022-11-11 RX ORDER — OMEGA-3 FATTY ACIDS/FISH OIL 300-1000MG
200 CAPSULE ORAL EVERY 4 HOURS PRN
COMMUNITY

## 2022-11-11 NOTE — LETTER
11/11/2022         RE: Mary J Bruton  [address] [address]        Dear Colleague,    Thank you for referring your patient, Mary J Bruton, to the [facility] SUR[facility] AND BARIATRICS CARE [place]. Please see a copy of my visit note below.    HPI: Pt is here for follow up of a status post robotic ventral hernia repair she is doing much better..  She is doing well. Her appetite is good, and bowel function regular.  No fevers or chills. Ambulating without problems.  Because she had multiple hernias in the midline she did have a diastases to brought this tissue back if she cannot anterior bulge which is now just a seroma and from that space and this is just slowly retracting and dee down.  She is wearing her binder and no fevers or chills and has no major complaints or problems.      /76 (BP Location: Right arm, Patient Position: Sitting, Cuff Size: Adult Small)       EXAM: This is a  77 year old WOMAN in no distress  GENERAL: Appears well  CHEST clear  CVS S1S2 NSR  ABDOMEN: Soft, non-tender.  Repair is intact she does have a seroma and abdominal wall but is much smaller than initially.  EXT: warm, moves without difficulty         Assessment/Plan:   Status post robotic repair of multiple ventral hernias.  She is doing well    We discussed continued binder use for least a couple more weeks no lifting for another couple more weeks when she is up to do those things for my standpoint I will see her back in perivasal/distal problems or issues.  Discussed and answered all questions with her and her  today.    No follow-ups on file.    Roman George MD ,MD  [facility] Department of Surgery      Again, thank you for allowing me to participate in the care of your patient.        Sincerely,        Roman George MD

## 2022-11-23 NOTE — PROGRESS NOTES
HPI: Pt is here for follow up of a status post robotic ventral hernia repair she is doing much better..  She is doing well. Her appetite is good, and bowel function regular.  No fevers or chills. Ambulating without problems.  Because she had multiple hernias in the midline she did have a diastases to brought this tissue back if she cannot anterior bulge which is now just a seroma and from that space and this is just slowly retracting and dee down.  She is wearing her binder and no fevers or chills and has no major complaints or problems.      /76 (BP Location: Right arm, Patient Position: Sitting, Cuff Size: Adult Small)       EXAM: This is a  77 year old WOMAN in no distress  GENERAL: Appears well  CHEST clear  CVS S1S2 NSR  ABDOMEN: Soft, non-tender.  Repair is intact she does have a seroma and abdominal wall but is much smaller than initially.  EXT: warm, moves without difficulty         Assessment/Plan:   Status post robotic repair of multiple ventral hernias.  She is doing well    We discussed continued binder use for least a couple more weeks no lifting for another couple more weeks when she is up to do those things for my standpoint I will see her back in perivasal/distal problems or issues.  Discussed and answered all questions with her and her  today.    No follow-ups on file.    Roman George MD ,MD  [facility] Department of Surgery

## 2023-05-05 ENCOUNTER — OFFICE VISIT (OUTPATIENT)
Dept: SURGERY | Facility: CLINIC | Age: 78
End: 2023-05-05
Payer: COMMERCIAL

## 2023-05-05 VITALS — BODY MASS INDEX: 23.78 KG/M2 | WEIGHT: 130 LBS

## 2023-05-05 DIAGNOSIS — K43.2 INCISIONAL HERNIA, WITHOUT OBSTRUCTION OR GANGRENE: Primary | ICD-10-CM

## 2023-05-05 PROCEDURE — 99213 OFFICE O/P EST LOW 20 MIN: CPT | Performed by: SPECIALIST

## 2023-05-05 NOTE — LETTER
5/5/2023         RE: Mary J Bruton  [address] [address]        Dear Colleague,    Thank you for referring your patient, Mary J Bruton, to the [facility] SURGERY CLINIC AND BARIATRICS CARE [place]. Please see a copy of my visit note below.          HPI:  This is a 78 year old female here today with concerns of pain and bulging in her ventral area. She has noted this for the past a a few  month. The symptoms have progressed and increased over this time. She comes in for evaluation secondary to the hernia causing enough issues to bother them with daily activities or chores.    Allergies:Atenolol and Lisinopril    Past Medical History:   Diagnosis Date     Benign essential hypertension      Colon polyp 12/31/2018     Complication of anesthesia      Dehydration      Diverticular disease of large intestine 2/7/2017     History of colonic polyps      HTN (hypertension) 12/31/2018    Formatting of this note might be different from the original. 4/12/2021 - had side effects with hydrochlorothiazide. 3/4/2021 - losartan. Add hydrochlorothiazide. 12/31/2018 -spironolactone and losartan.  She does not want to change her regimen.     Hypercholesterolemia      Lactic acidosis      Osteopenia      SBO (small bowel obstruction) (H) 5/20/2020     Umbilical hernia 9/23/2021    Formatting of this note might be different from the original. 9/23/2021 - tiny asymptomatic. Found incidentally.     Umbilical hernia without obstruction and without gangrene        Past Surgical History:   Procedure Laterality Date     DAVINCI XI HERNIORRHAPHY VENTRAL N/A 10/27/2022    Procedure: HERNIORRHAPHY, VENTRAL, ROBOT-ASSISTED, LAPAROSCOPIC WITH MESH, USING DA JULIETTE XI;  Surgeon: Roman George MD;  Location: [facility]     LAPAROTOMY EXPLORATORY N/A 5/21/2020    Procedure: EXPLORATORY LAPAROTOMY WITH LYSIS OF ADHESIONS;  Surgeon: Roman George MD;  Location: [facility];  Service: General        CURRENT MEDS:      History reviewed. No pertinent family history.     reports that she has quit smoking. She has never used smokeless tobacco. She reports current alcohol use of about 1.0 standard drink of alcohol per week. She reports that she does not use drugs.    Review of Systems - Negative except hernia repair 1 year ago.  This was done robotically.     Vitals:    05/05/23 1421   Weight: 59 kg (130 lb)       Body mass index is 23.78 kg/m .    EXAM:  General: NAD   HEENT: normocephalic, PERRLA and EOMS intact  Mounth: Mucus membranes moist  Neck: Supple  Chest: Clear to auscultation bilaterally  CV: RRR  ABD: Soft nontender and nondistended, ventral hernia, reducible  EXT: Warm, pulses intact,   Neuro: Alert and oriented x3  Back: no CVA tenderness      IMAGES: pending    Assessment/Plan: Pt with a ventral hernia. I discussed this at length with She.  I went over conservative management as well as surgical treatment of this.. I would plan on doing this via anrobotic  approach with possible use of mesh. I went over the small risks of surgery including but not limited to bleeding and infection, anesthesia, recurrence rates and nerve injury. I discussed the expected recovery time as well. Will get this scheduled. She will contact us to have this scheduled.     Plan to do a component separation release to repair this or at least a retrorectus repair.  Depending on what the CT scan shows     Roman George MD ,MD Roman George MD  General Surgery [phone]  Vascular Surgery [phone]          Again, thank you for allowing me to participate in the care of your patient.        Sincerely,        Roman George MD

## 2023-05-08 ENCOUNTER — HOSPITAL ENCOUNTER (OUTPATIENT)
Dept: CT IMAGING | Facility: HOSPITAL | Age: 78
Discharge: HOME | End: 2023-05-08
Attending: SPECIALIST | Admitting: SPECIALIST
Payer: COMMERCIAL

## 2023-05-08 DIAGNOSIS — K43.2 INCISIONAL HERNIA, WITHOUT OBSTRUCTION OR GANGRENE: ICD-10-CM

## 2023-05-08 PROCEDURE — 74176 CT ABD & PELVIS W/O CONTRAST: CPT

## 2023-05-10 DIAGNOSIS — K43.9 VENTRAL HERNIA WITHOUT OBSTRUCTION OR GANGRENE: Primary | ICD-10-CM

## 2023-05-10 RX ORDER — CEFAZOLIN SODIUM/WATER 2 G/20 ML
2 SYRINGE (ML) INTRAVENOUS SEE ADMIN INSTRUCTIONS
Status: CANCELLED | OUTPATIENT
Start: 2023-06-08

## 2023-05-10 RX ORDER — ACETAMINOPHEN 325 MG/1
975 TABLET ORAL ONCE
Status: CANCELLED | OUTPATIENT
Start: 2023-06-08 | End: 2023-05-10

## 2023-05-10 RX ORDER — CEFAZOLIN SODIUM/WATER 2 G/20 ML
2 SYRINGE (ML) INTRAVENOUS
Status: CANCELLED | OUTPATIENT
Start: 2023-06-08

## 2023-05-12 ENCOUNTER — TELEPHONE (OUTPATIENT)
Dept: SURGERY | Facility: CLINIC | Age: 78
End: 2023-05-12
Payer: COMMERCIAL

## 2023-05-12 NOTE — TELEPHONE ENCOUNTER
Beverly returned my call and she's now scheduled for surgery with Dr. George at [facility] on 06.08.23. We went over pre and post op appointments. I let her know I will send a confirmation letter to her MyChart. She's in agreement with the plan.

## 2023-05-18 NOTE — H&P (VIEW-ONLY)
PREOPERATIVE CLEARANCE  Date of Exam: 2023    Nursing Notes:   Roselyn Antoine  2023  9:23 AM  Signed  Mary Joan Bruton is a 78 y.o. female (1945) who presents for preop evaluation undergoing HERNIORRHAPHY, VENTRAL, ROBOT-ASSISTED, LAPAROSCOPIC, USING DA JULIETTE XI    Date of Surgery: 23  Surgical Specialty: General  Dr KM Walters  [facility] Facility: [facility]   Fax number:  Surgery type: outpatient  Primary Physician: Darian Mason LPN,  2023 9:19 AM            HPI:  Patient is an 78-year-old woman who presents to clinic for preop examination.  Reports good exercise capacity.  Her hernia surgery within the last year went well.  She has no particular concerns.  No chest pain or chest pressure.    Problem List:   Patient Active Problem List   Diagnosis Code     Osteopenia M85.80     HTN (hypertension) I10     Colon polyp K63.5     Umbilical hernia K42.9      Histories:  Past Medical History:   . Date     Colon polyps      Fibrocystic breast      Hypercholesteremia     borderline     Hypertension     borderline     Left knee pain       Past Surgical History:   . Laterality Date     CHOLECYSTECTOMY       COLONOSCOPY SCREENING  2011    polypectomy     KNEE ARTHROSCOPY Right     LMR     KNEE ARTHROSCOPY Left 2016    PLM     precancerous lesions frozen off       Social History     Socioeconomic History     Marital status:    Tobacco Use     Smoking status: Former     Current packs/day: 0.00     Types: Cigarettes     Quit date: 1967     Years since quittin.1     Smokeless tobacco: Never   Vaping Use     Vaping Use: Never used   Substance and Sexual Activity     Alcohol use: Yes     Alcohol/week: 1.0 standard drink of alcohol     Types: 1 Standard drinks or equivalent per week     Comment: 10/17/2022     Drug use: No     Sexual activity: Yes     Partners: Male      OB History    Para Term  AB Living   3 3 3 0 0 3    SAB IAB Ectopic Multiple Live Births   0 0 0 0 3     Family History   Problem Relation Age of Onset     Osteoporosis Mother      Thyroid Disease Mother      Heart Disease Father      Stroke Father      Hypertension Sister      Hypertension Brother      Cancer-breast No Family History       Allergies: Atenolol, Hctz [hydrochlorothiazide], and Lisinopril     Current Medications:  Current Outpatient Rx   Medication Sig Dispense Refill     calcium carbonate (CALTRATE) 600 mg calcium (1,500 mg) tablet Take 2 Tablets (1,200 mg) by mouth 2 times daily with meals.  0     losartan (COZAAR) 50 mg tablet Take 1 Tablet (50 mg) by mouth once daily. TAKE 1 TABLET(50 MG) BY MOUTH EVERY DAY FOR HIGH BLOOD PRESSURE 90 Tablet 11     magnesium 250 mg tab Take 1 Tablet (250 mg) by mouth once daily.  0     raloxifene (EVISTA) 60 mg tablet Take 1 Tablet (60 mg) by mouth once daily. 90 Tablet 3     spironolactone (ALDACTONE) 25 mg tablet Take 2 Tablets (50 mg) by mouth every morning. 180 Tablet 11     vit C,N-Dg-fycqi-lutein-zeaxan (PreserVision AREDS-2) 250-90-40-1 mg chew Chew by mouth two times daily.  0     Medications have been reviewed by me and are current to the best of my knowledge and ability.    Recent use of: no recent use of aspirin (ASA), NSAIDS or steroids    HABITS:     Proposed anesthesia: General  Anesthesia Complications: History of excessive salivation with anesthesia  History of abnormal bleeding : None  History of Blood Transfusions: No    Health Care Directive or Living Will: yes    REVIEW OF SYSTEMS:  General: Denies general constitutional problems  Ears/Nose/Throat: Denies problems  Cardiovascular: Denies problems  Respiratory: Denies problems  Gastrointestinal: Denies problems  Genitourinary: Denies problems  Neurologic: Denies problems  Psychiatric: Denies problems    EXAM:   /68 (Cuff Site: Right Arm, Position: Sitting, Cuff Size: Adult Regular)   Pulse 85   Temp 97  F (36.1  C) (Oral)   Ht 1.58 m  "(5' 2.21\")   Wt 59 kg (130 lb)   LMP 04/24/1994   SpO2 99%   BMI 23.62 kg/m   Body mass index is 23.62 kg/m .  General: Patient appears comfortable and is in no apparent distress.  HEENT: Oropharynx is moist. No pharyngeal erythema or exudate.  Cardiovascular: Regular rate and rhythm.  No murmurs. No clicks rubs or gallops.  Lungs: Clear to auscultation bilaterally.  Psychiatric: Mood and affect are normal.      DIAGNOSTICS:   Potassium pending    IMPRESSION:   78-year-old woman presenting to clinic for preop examination  -Cleared for surgery with general anesthesia  -Hold losartan and spironolactone on day of surgery  -Hold vitamins and supplements starting 1 week before surgery    Patient is low risk for perioperative complications.    Darian Mason MD       5/18/2023 9:50 AM        "

## 2023-05-22 NOTE — PROGRESS NOTES
HPI:  This is a 78 year old female here today with concerns of pain and bulging in her ventral area. She has noted this for the past a a few  month. The symptoms have progressed and increased over this time. She comes in for evaluation secondary to the hernia causing enough issues to bother them with daily activities or chores.    Allergies:Atenolol and Lisinopril    Past Medical History:   Diagnosis Date     Benign essential hypertension      Colon polyp 12/31/2018     Complication of anesthesia      Dehydration      Diverticular disease of large intestine 2/7/2017     History of colonic polyps      HTN (hypertension) 12/31/2018    Formatting of this note might be different from the original. 4/12/2021 - had side effects with hydrochlorothiazide. 3/4/2021 - losartan. Add hydrochlorothiazide. 12/31/2018 -spironolactone and losartan.  She does not want to change her regimen.     Hypercholesterolemia      Lactic acidosis      Osteopenia      SBO (small bowel obstruction) (H) 5/20/2020     Umbilical hernia 9/23/2021    Formatting of this note might be different from the original. 9/23/2021 - tiny asymptomatic. Found incidentally.     Umbilical hernia without obstruction and without gangrene        Past Surgical History:   Procedure Laterality Date     DAVINCI XI HERNIORRHAPHY VENTRAL N/A 10/27/2022    Procedure: HERNIORRHAPHY, VENTRAL, ROBOT-ASSISTED, LAPAROSCOPIC WITH MESH, USING DA JULIETTE XI;  Surgeon: Roman George MD;  Location: [facility]     LAPAROTOMY EXPLORATORY N/A 5/21/2020    Procedure: EXPLORATORY LAPAROTOMY WITH LYSIS OF ADHESIONS;  Surgeon: Roman George MD;  Location: [facility];  Service: General       CURRENT MEDS:      History reviewed. No pertinent family history.     reports that she has quit smoking. She has never used smokeless tobacco. She reports current alcohol use of about 1.0 standard drink of alcohol per week. She reports that she does not use drugs.    Review of  Systems - Negative except hernia repair 1 year ago.  This was done robotically.     Vitals:    05/05/23 1421   Weight: 59 kg (130 lb)       Body mass index is 23.78 kg/m .    EXAM:  General: NAD   HEENT: normocephalic, PERRLA and EOMS intact  Mounth: Mucus membranes moist  Neck: Supple  Chest: Clear to auscultation bilaterally  CV: RRR  ABD: Soft nontender and nondistended, ventral hernia, reducible  EXT: Warm, pulses intact,   Neuro: Alert and oriented x3  Back: no CVA tenderness      IMAGES: pending    Assessment/Plan: Pt with a ventral hernia. I discussed this at length with She.  I went over conservative management as well as surgical treatment of this.. I would plan on doing this via anrobotic  approach with possible use of mesh. I went over the small risks of surgery including but not limited to bleeding and infection, anesthesia, recurrence rates and nerve injury. I discussed the expected recovery time as well. Will get this scheduled. She will contact us to have this scheduled.     Plan to do a component separation release to repair this or at least a retrorectus repair.  Depending on what the CT scan shows     Roman George MD ,MD Roman George MD  General Surgery [phone]  Vascular Surgery [phone]

## 2023-06-08 ENCOUNTER — HOSPITAL ENCOUNTER (OUTPATIENT)
Facility: HOSPITAL | Age: 78
Discharge: HOME | End: 2023-06-08
Attending: SPECIALIST | Admitting: SPECIALIST
Payer: COMMERCIAL

## 2023-06-08 ENCOUNTER — ANESTHESIA EVENT (OUTPATIENT)
Dept: SURGERY | Facility: HOSPITAL | Age: 78
End: 2023-06-08
Payer: COMMERCIAL

## 2023-06-08 ENCOUNTER — ANESTHESIA (OUTPATIENT)
Dept: SURGERY | Facility: HOSPITAL | Age: 78
End: 2023-06-08
Payer: COMMERCIAL

## 2023-06-08 VITALS
SYSTOLIC BLOOD PRESSURE: 132 MMHG | HEART RATE: 67 BPM | OXYGEN SATURATION: 94 % | TEMPERATURE: 97.5 F | HEIGHT: 63 IN | BODY MASS INDEX: 23.64 KG/M2 | WEIGHT: 133.4 LBS | RESPIRATION RATE: 14 BRPM | DIASTOLIC BLOOD PRESSURE: 58 MMHG

## 2023-06-08 DIAGNOSIS — K43.9 VENTRAL HERNIA WITHOUT OBSTRUCTION OR GANGRENE: Primary | ICD-10-CM

## 2023-06-08 DIAGNOSIS — K56.609 SBO (SMALL BOWEL OBSTRUCTION) (H): ICD-10-CM

## 2023-06-08 LAB — GLUCOSE BLDC GLUCOMTR-MCNC: 93 MG/DL (ref 70–99)

## 2023-06-08 PROCEDURE — 250N000011 HC RX IP 250 OP 636: Performed by: ANESTHESIOLOGY

## 2023-06-08 PROCEDURE — 250N000011 HC RX IP 250 OP 636: Performed by: SPECIALIST

## 2023-06-08 PROCEDURE — 258N000003 HC RX IP 258 OP 636: Performed by: ANESTHESIOLOGY

## 2023-06-08 PROCEDURE — 49593 RPR AA HRN 1ST 3-10 RDC: CPT | Performed by: SPECIALIST

## 2023-06-08 PROCEDURE — 272N000001 HC OR GENERAL SUPPLY STERILE: Performed by: SPECIALIST

## 2023-06-08 PROCEDURE — 250N000011 HC RX IP 250 OP 636: Performed by: NURSE ANESTHETIST, CERTIFIED REGISTERED

## 2023-06-08 PROCEDURE — C9290 INJ, BUPIVACAINE LIPOSOME: HCPCS | Performed by: ANESTHESIOLOGY

## 2023-06-08 PROCEDURE — 360N000080 HC SURGERY LEVEL 7, PER MIN: Performed by: SPECIALIST

## 2023-06-08 PROCEDURE — 250N000009 HC RX 250: Performed by: NURSE ANESTHETIST, CERTIFIED REGISTERED

## 2023-06-08 PROCEDURE — 710N000009 HC RECOVERY PHASE 1, LEVEL 1, PER MIN: Performed by: SPECIALIST

## 2023-06-08 PROCEDURE — 258N000003 HC RX IP 258 OP 636: Performed by: NURSE ANESTHETIST, CERTIFIED REGISTERED

## 2023-06-08 PROCEDURE — 250N000025 HC SEVOFLURANE, PER MIN: Performed by: SPECIALIST

## 2023-06-08 PROCEDURE — 999N000141 HC STATISTIC PRE-PROCEDURE NURSING ASSESSMENT: Performed by: SPECIALIST

## 2023-06-08 PROCEDURE — 250N000013 HC RX MED GY IP 250 OP 250 PS 637: Performed by: SPECIALIST

## 2023-06-08 PROCEDURE — 250N000009 HC RX 250: Performed by: SPECIALIST

## 2023-06-08 PROCEDURE — C1781 MESH (IMPLANTABLE): HCPCS | Performed by: SPECIALIST

## 2023-06-08 PROCEDURE — 710N000012 HC RECOVERY PHASE 2, PER MINUTE: Performed by: SPECIALIST

## 2023-06-08 PROCEDURE — 370N000017 HC ANESTHESIA TECHNICAL FEE, PER MIN: Performed by: SPECIALIST

## 2023-06-08 DEVICE — COMPOSITE MESH MONOFILAMENT POLYPROPYLENE MESH WITH ABSORBABLE SYNTHETIC FILM AND MARKING
Type: IMPLANTABLE DEVICE | Site: ABDOMEN | Status: FUNCTIONAL
Brand: PARIETENE DS

## 2023-06-08 RX ORDER — VIT C/E/ZN/COPPR/LUTEIN/ZEAXAN 250MG-90MG
1 CAPSULE ORAL
Status: ON HOLD | COMMUNITY
Start: 2023-05-18 | End: 2023-06-08

## 2023-06-08 RX ORDER — ONDANSETRON 2 MG/ML
4 INJECTION INTRAMUSCULAR; INTRAVENOUS EVERY 30 MIN PRN
Status: DISCONTINUED | OUTPATIENT
Start: 2023-06-08 | End: 2023-06-08 | Stop reason: HOSPADM

## 2023-06-08 RX ORDER — FENTANYL CITRATE 50 UG/ML
25 INJECTION, SOLUTION INTRAMUSCULAR; INTRAVENOUS EVERY 5 MIN PRN
Status: DISCONTINUED | OUTPATIENT
Start: 2023-06-08 | End: 2023-06-08 | Stop reason: HOSPADM

## 2023-06-08 RX ORDER — KETAMINE HYDROCHLORIDE 10 MG/ML
INJECTION INTRAMUSCULAR; INTRAVENOUS PRN
Status: DISCONTINUED | OUTPATIENT
Start: 2023-06-08 | End: 2023-06-08

## 2023-06-08 RX ORDER — ONDANSETRON 4 MG/1
4 TABLET, ORALLY DISINTEGRATING ORAL EVERY 30 MIN PRN
Status: DISCONTINUED | OUTPATIENT
Start: 2023-06-08 | End: 2023-06-08 | Stop reason: HOSPADM

## 2023-06-08 RX ORDER — PROPOFOL 10 MG/ML
INJECTION, EMULSION INTRAVENOUS PRN
Status: DISCONTINUED | OUTPATIENT
Start: 2023-06-08 | End: 2023-06-08

## 2023-06-08 RX ORDER — BUPIVACAINE HYDROCHLORIDE 2.5 MG/ML
INJECTION, SOLUTION EPIDURAL; INFILTRATION; INTRACAUDAL PRN
Status: DISCONTINUED | OUTPATIENT
Start: 2023-06-08 | End: 2023-06-08 | Stop reason: HOSPADM

## 2023-06-08 RX ORDER — LIDOCAINE HYDROCHLORIDE 10 MG/ML
INJECTION, SOLUTION INFILTRATION; PERINEURAL PRN
Status: DISCONTINUED | OUTPATIENT
Start: 2023-06-08 | End: 2023-06-08

## 2023-06-08 RX ORDER — ACETAMINOPHEN 325 MG/1
975 TABLET ORAL ONCE
Status: COMPLETED | OUTPATIENT
Start: 2023-06-08 | End: 2023-06-08

## 2023-06-08 RX ORDER — FENTANYL CITRATE 50 UG/ML
50 INJECTION, SOLUTION INTRAMUSCULAR; INTRAVENOUS EVERY 5 MIN PRN
Status: DISCONTINUED | OUTPATIENT
Start: 2023-06-08 | End: 2023-06-08 | Stop reason: HOSPADM

## 2023-06-08 RX ORDER — SODIUM CHLORIDE, SODIUM LACTATE, POTASSIUM CHLORIDE, CALCIUM CHLORIDE 600; 310; 30; 20 MG/100ML; MG/100ML; MG/100ML; MG/100ML
INJECTION, SOLUTION INTRAVENOUS CONTINUOUS
Status: DISCONTINUED | OUTPATIENT
Start: 2023-06-08 | End: 2023-06-08 | Stop reason: HOSPADM

## 2023-06-08 RX ORDER — OXYCODONE AND ACETAMINOPHEN 5; 325 MG/1; MG/1
1 TABLET ORAL EVERY 6 HOURS PRN
Qty: 12 TABLET | Refills: 0 | Status: SHIPPED | OUTPATIENT
Start: 2023-06-08 | End: 2023-06-11

## 2023-06-08 RX ORDER — CEFAZOLIN SODIUM/WATER 2 G/20 ML
2 SYRINGE (ML) INTRAVENOUS SEE ADMIN INSTRUCTIONS
Status: DISCONTINUED | OUTPATIENT
Start: 2023-06-08 | End: 2023-06-08 | Stop reason: HOSPADM

## 2023-06-08 RX ORDER — GLYCOPYRROLATE 0.2 MG/ML
INJECTION, SOLUTION INTRAMUSCULAR; INTRAVENOUS PRN
Status: DISCONTINUED | OUTPATIENT
Start: 2023-06-08 | End: 2023-06-08

## 2023-06-08 RX ORDER — BUPIVACAINE HYDROCHLORIDE 2.5 MG/ML
INJECTION, SOLUTION EPIDURAL; INFILTRATION; INTRACAUDAL
Status: COMPLETED | OUTPATIENT
Start: 2023-06-08 | End: 2023-06-08

## 2023-06-08 RX ORDER — HYDROMORPHONE HYDROCHLORIDE 1 MG/ML
0.2 INJECTION, SOLUTION INTRAMUSCULAR; INTRAVENOUS; SUBCUTANEOUS EVERY 5 MIN PRN
Status: DISCONTINUED | OUTPATIENT
Start: 2023-06-08 | End: 2023-06-08 | Stop reason: HOSPADM

## 2023-06-08 RX ORDER — CEFAZOLIN SODIUM/WATER 2 G/20 ML
2 SYRINGE (ML) INTRAVENOUS
Status: COMPLETED | OUTPATIENT
Start: 2023-06-08 | End: 2023-06-08

## 2023-06-08 RX ORDER — CALCIUM CARBONATE/VITAMIN D3 600 MG-10
1 TABLET ORAL 2 TIMES DAILY
COMMUNITY

## 2023-06-08 RX ORDER — HYDROMORPHONE HYDROCHLORIDE 1 MG/ML
0.4 INJECTION, SOLUTION INTRAMUSCULAR; INTRAVENOUS; SUBCUTANEOUS EVERY 5 MIN PRN
Status: DISCONTINUED | OUTPATIENT
Start: 2023-06-08 | End: 2023-06-08 | Stop reason: HOSPADM

## 2023-06-08 RX ORDER — LIDOCAINE 40 MG/G
CREAM TOPICAL
Status: DISCONTINUED | OUTPATIENT
Start: 2023-06-08 | End: 2023-06-08 | Stop reason: HOSPADM

## 2023-06-08 RX ORDER — MAGNESIUM SULFATE 4 G/50ML
4 INJECTION INTRAVENOUS ONCE
Status: COMPLETED | OUTPATIENT
Start: 2023-06-08 | End: 2023-06-08

## 2023-06-08 RX ORDER — DEXAMETHASONE SODIUM PHOSPHATE 10 MG/ML
INJECTION, SOLUTION INTRAMUSCULAR; INTRAVENOUS PRN
Status: DISCONTINUED | OUTPATIENT
Start: 2023-06-08 | End: 2023-06-08

## 2023-06-08 RX ORDER — ONDANSETRON 2 MG/ML
INJECTION INTRAMUSCULAR; INTRAVENOUS PRN
Status: DISCONTINUED | OUTPATIENT
Start: 2023-06-08 | End: 2023-06-08

## 2023-06-08 RX ORDER — KETOROLAC TROMETHAMINE 30 MG/ML
INJECTION, SOLUTION INTRAMUSCULAR; INTRAVENOUS PRN
Status: DISCONTINUED | OUTPATIENT
Start: 2023-06-08 | End: 2023-06-08

## 2023-06-08 RX ORDER — ANTIOX #8/OM3/DHA/EPA/LUT/ZEAX 250-2.5 MG
1 CAPSULE ORAL 2 TIMES DAILY
COMMUNITY

## 2023-06-08 RX ORDER — FENTANYL CITRATE 50 UG/ML
INJECTION, SOLUTION INTRAMUSCULAR; INTRAVENOUS PRN
Status: DISCONTINUED | OUTPATIENT
Start: 2023-06-08 | End: 2023-06-08

## 2023-06-08 RX ADMIN — SODIUM CHLORIDE, POTASSIUM CHLORIDE, SODIUM LACTATE AND CALCIUM CHLORIDE: 600; 310; 30; 20 INJECTION, SOLUTION INTRAVENOUS at 14:24

## 2023-06-08 RX ADMIN — PHENYLEPHRINE HYDROCHLORIDE 100 MCG: 10 INJECTION INTRAVENOUS at 11:44

## 2023-06-08 RX ADMIN — Medication 2 G: at 11:37

## 2023-06-08 RX ADMIN — ROCURONIUM BROMIDE 40 MG: 50 INJECTION, SOLUTION INTRAVENOUS at 11:44

## 2023-06-08 RX ADMIN — SODIUM CHLORIDE, POTASSIUM CHLORIDE, SODIUM LACTATE AND CALCIUM CHLORIDE: 600; 310; 30; 20 INJECTION, SOLUTION INTRAVENOUS at 11:15

## 2023-06-08 RX ADMIN — FENTANYL CITRATE 25 MCG: 50 INJECTION, SOLUTION INTRAMUSCULAR; INTRAVENOUS at 15:25

## 2023-06-08 RX ADMIN — FENTANYL CITRATE 25 MCG: 50 INJECTION, SOLUTION INTRAMUSCULAR; INTRAVENOUS at 14:44

## 2023-06-08 RX ADMIN — MAGNESIUM SULFATE HEPTAHYDRATE 4 G: 80 INJECTION, SOLUTION INTRAVENOUS at 11:15

## 2023-06-08 RX ADMIN — SUGAMMADEX 200 MG: 100 INJECTION, SOLUTION INTRAVENOUS at 14:23

## 2023-06-08 RX ADMIN — LIDOCAINE HYDROCHLORIDE 5 ML: 10 INJECTION, SOLUTION INFILTRATION; PERINEURAL at 11:44

## 2023-06-08 RX ADMIN — BUPIVACAINE HYDROCHLORIDE 20 ML: 2.5 INJECTION, SOLUTION EPIDURAL; INFILTRATION; INTRACAUDAL at 14:18

## 2023-06-08 RX ADMIN — ONDANSETRON 4 MG: 2 INJECTION INTRAMUSCULAR; INTRAVENOUS at 14:02

## 2023-06-08 RX ADMIN — FENTANYL CITRATE 25 MCG: 50 INJECTION, SOLUTION INTRAMUSCULAR; INTRAVENOUS at 15:16

## 2023-06-08 RX ADMIN — GLYCOPYRROLATE 0.2 MG: 0.2 INJECTION INTRAMUSCULAR; INTRAVENOUS at 11:44

## 2023-06-08 RX ADMIN — PHENYLEPHRINE HYDROCHLORIDE 100 MCG: 10 INJECTION INTRAVENOUS at 12:10

## 2023-06-08 RX ADMIN — KETAMINE HYDROCHLORIDE 30 MG: 10 INJECTION, SOLUTION INTRAMUSCULAR; INTRAVENOUS at 11:44

## 2023-06-08 RX ADMIN — ACETAMINOPHEN 975 MG: 325 TABLET ORAL at 11:17

## 2023-06-08 RX ADMIN — FENTANYL CITRATE 25 MCG: 50 INJECTION, SOLUTION INTRAMUSCULAR; INTRAVENOUS at 15:37

## 2023-06-08 RX ADMIN — PHENYLEPHRINE HYDROCHLORIDE 100 MCG: 10 INJECTION INTRAVENOUS at 12:15

## 2023-06-08 RX ADMIN — DEXAMETHASONE SODIUM PHOSPHATE 10 MG: 10 INJECTION, SOLUTION INTRAMUSCULAR; INTRAVENOUS at 11:44

## 2023-06-08 RX ADMIN — FENTANYL CITRATE 100 MCG: 50 INJECTION, SOLUTION INTRAMUSCULAR; INTRAVENOUS at 11:44

## 2023-06-08 RX ADMIN — BUPIVACAINE 20 ML: 13.3 INJECTION, SUSPENSION, LIPOSOMAL INFILTRATION at 14:18

## 2023-06-08 RX ADMIN — PROPOFOL 130 MG: 10 INJECTION, EMULSION INTRAVENOUS at 11:44

## 2023-06-08 RX ADMIN — KETOROLAC TROMETHAMINE 15 MG: 30 INJECTION, SOLUTION INTRAMUSCULAR at 14:11

## 2023-06-08 RX ADMIN — ROCURONIUM BROMIDE 10 MG: 50 INJECTION, SOLUTION INTRAVENOUS at 11:58

## 2023-06-08 RX ADMIN — HYDROMORPHONE HYDROCHLORIDE 0.5 MG: 1 INJECTION, SOLUTION INTRAMUSCULAR; INTRAVENOUS; SUBCUTANEOUS at 13:10

## 2023-06-08 RX ADMIN — PROPOFOL 20 MG: 10 INJECTION, EMULSION INTRAVENOUS at 14:15

## 2023-06-08 RX ADMIN — PHENYLEPHRINE HYDROCHLORIDE 100 MCG: 10 INJECTION INTRAVENOUS at 12:04

## 2023-06-08 RX ADMIN — ROCURONIUM BROMIDE 20 MG: 50 INJECTION, SOLUTION INTRAVENOUS at 12:58

## 2023-06-08 ASSESSMENT — ACTIVITIES OF DAILY LIVING (ADL)
ADLS_ACUITY_SCORE: 20

## 2023-06-08 NOTE — PHARMACY-ADMISSION MEDICATION HISTORY
Pharmacist Admission Medication History    Admission medication history is complete. The information provided in this note is only as accurate as the sources available at the time of the update.    Medication reconciliation/reorder completed by provider prior to medication history? No    Information Source(s): Patient and CareEverywhere/SureScripts via in-person    Pertinent Information: She often forgets to take evening doses of twice daily medications    Changes made to PTA medication list:    Added: None    Deleted: None    Changed: Calcium to calcium/vitamin D    Medication Affordability:  Not including over the counter (OTC) medications, was there a time in the past 3 months when you did not take your medications as prescribed because of cost?: No    Allergies reviewed with patient and updates made in EHR: yes    Medication History Completed By: Brandy Williamson PharmD 6/8/2023 11:31 AM    Prior to Admission medications    Medication Sig Last Dose Taking? Auth Provider Long Term End Date   acetaminophen (TYLENOL) 500 MG tablet [ACETAMINOPHEN (TYLENOL) 500 MG TABLET] Take 1 tablet (500 mg total) by mouth every 4 (four) hours.  Patient taking differently: Take 500 mg by mouth every 4 hours as needed More than a month at PRN Yes Thor Turner PA-C     calcium carbonate-vitamin D (CALTRATE) 600-10 MG-MCG per tablet Take 1 tablet by mouth 2 times daily Past Week at 1200 Yes Unknown, Entered By History     ibuprofen (ADVIL/MOTRIN) 200 MG capsule Take 200 mg by mouth every 4 hours as needed for fever More than a month at PRN Yes Reported, Patient     losartan (COZAAR) 50 MG tablet Take 50 mg by mouth daily 6/7/2023 at 1200 Yes Reported, Patient Yes    magnesium oxide 250 mg magnesium Tab [MAGNESIUM OXIDE 250 MG MAGNESIUM TAB] Take 250 mg by mouth daily. Past Week at 1200 Yes Provider, Historical     Multiple Vitamins-Minerals (PRESERVISION AREDS 2) CAPS Take 1 capsule by mouth 2 times daily Past Week at 1200  Yes Unknown, Entered By History No    raloxifene (EVISTA) 60 mg tablet [RALOXIFENE (EVISTA) 60 MG TABLET] Take 60 mg by mouth daily. Past Week at 1200 Yes Provider, Historical     spironolactone (ALDACTONE) 25 MG tablet Take 50 mg by mouth daily 6/7/2023 at 1200 Yes Reported, Patient Yes

## 2023-06-08 NOTE — OR NURSING
Discharge criteria met; patient alert, oriented, and cooperative.  Patient transported to main entrance via wheelchair for  by  to take home.

## 2023-06-08 NOTE — OR NURSING
Discharge instructions reviewed with patient and spouse; all questions and concerns addressed; both verbalized understanding.

## 2023-06-08 NOTE — OR NURSING
Patient placed in Phase 2 at 1607 by PACU RN, then immediately patient requested to use he bathroom.  Patient was assisted to bathroom but was unable to void.  Patient transferred to recliner chair before arriving in Phase 2 at 1625.

## 2023-06-08 NOTE — ANESTHESIA POSTPROCEDURE EVALUATION
Patient: Mary J Bruton    Procedure: Procedure(s):  HERNIORRHAPHY, VENTRAL, ROBOT-ASSISTED, LAPAROSCOPIC, USING DA JULIETTE XI WITH MESH, LYSIS OF ADHESIONS       Anesthesia Type:  General    Note:  Disposition: Admission   Postop Pain Control: Uneventful            Sign Out: Well controlled pain   PONV: No   Neuro/Psych: Uneventful            Sign Out: Acceptable/Baseline neuro status   Airway/Respiratory: Uneventful            Sign Out: O2 supplementation               Oxygen: Nasal Cannula   CV/Hemodynamics: Uneventful            Sign Out: Acceptable CV status; No obvious hypovolemia; No obvious fluid overload   Other NRE: NONE   DID A NON-ROUTINE EVENT OCCUR?            Last vitals:  Vitals Value Taken Time   /71 06/08/23 1500   Temp     Pulse 77 06/08/23 1503   Resp 18 06/08/23 1503   SpO2 98 % 06/08/23 1503   Vitals shown include unvalidated device data.    Electronically Signed By: Darian Black MD  June 8, 2023  3:04 PM

## 2023-06-08 NOTE — ANESTHESIA CARE TRANSFER NOTE
Patient: Mary J Bruton    Procedure: Procedure(s):  HERNIORRHAPHY, VENTRAL, ROBOT-ASSISTED, LAPAROSCOPIC, USING DA JULIETTE XI WITH MESH, LYSIS OF ADHESIONS       Diagnosis: Ventral hernia without obstruction or gangrene [K43.9]  Diagnosis Additional Information: No value filed.    Anesthesia Type:   General     Note:    Oropharynx: oropharynx clear of all foreign objects and spontaneously breathing  Level of Consciousness: awake and drowsy  Oxygen Supplementation: face mask  Level of Supplemental Oxygen (L/min / FiO2): 5  Independent Airway: airway patency satisfactory and stable  Dentition: dentition unchanged  Vital Signs Stable: post-procedure vital signs reviewed and stable  Report to RN Given: handoff report given  Patient transferred to: PACU    Handoff Report: Identifed the Patient, Identified the Reponsible Provider, Reviewed the pertinent medical history, Discussed the surgical course, Reviewed Intra-OP anesthesia mangement and issues during anesthesia, Set expectations for post-procedure period and Allowed opportunity for questions and acknowledgement of understanding      Vitals:  Vitals Value Taken Time   /77 06/08/23 1434   Temp 98.0 06/08/23 1434   Pulse 81 06/08/23 1434   Resp 17 06/08/23 1434   SpO2 99 % 06/08/23 1434   Vitals shown include unvalidated device data.    Electronically Signed By: JOHNNY Padilla CRNA  June 8, 2023  2:35 PM

## 2023-06-08 NOTE — ANESTHESIA PROCEDURE NOTES
Airway       Patient location during procedure: OR       Procedure Start/Stop Times: 6/8/2023 11:48 AM  Staff -        Anesthesiologist:  Darian Black MD       Resident/Fellow: Fina Silva       CRNA: Rebecca Thomas APRN CRNA       Performed By: SRNA  Consent for Airway        Urgency: elective  Indications and Patient Condition       Indications for airway management: silvana-procedural       Induction type:intravenous       Mask difficulty assessment: 1 - vent by mask    Final Airway Details       Final airway type: endotracheal airway       Successful airway: ETT - single  Endotracheal Airway Details        ETT size (mm): 7.0       Cuffed: yes       Successful intubation technique: direct laryngoscopy       DL Blade Type: Putnam 2       Grade View of Cords: 2       Adjucts: stylet       Position: Right       Measured from: gums/teeth       Secured at (cm): 22       Bite block used: None    Post intubation assessment        Placement verified by: capnometry, equal breath sounds and chest rise        Number of attempts at approach: 1       Secured with: silk tape       Ease of procedure: easy       Dentition: Intact and Unchanged       Dental guard used and removed. Dental Guard Type: Proguard Red.    Medication(s) Administered   Medication Administration Time: 6/8/2023 11:48 AM

## 2023-06-08 NOTE — OP NOTE
Operative Note    Name:  Mary J Bruton  PCP:  Darian Mason  Procedure Date:  6/8/2023      Procedure(s):  HERNIORRHAPHY, VENTRAL, ROBOT-ASSISTED, LAPAROSCOPIC, USING DA JULIETTE XI WITH MESH, LYSIS OF ADHESIONS    Pre-Procedure Diagnosis:  Ventral hernia without obstruction or gangrene [K43.9]     Post-Procedure Diagnosis:    ventral hernia    OR staff:  Surgeon(s):  Roman George MD  Circulator: Bessy Landin RN; Edmond Méndez RN  Relief Scrub: Justine Dietz  Scrub Person: Lavern Freeman; Ty Barnes      Anesthesia Type:  General with Block    Past Medical History:   Diagnosis Date     Arthritis      Benign essential hypertension      Colon polyp 12/31/2018     Complication of anesthesia      Dehydration      Diverticular disease of large intestine 02/07/2017     History of colonic polyps      HTN (hypertension) 12/31/2018    Formatting of this note might be different from the original. 4/12/2021 - had side effects with hydrochlorothiazide. 3/4/2021 - losartan. Add hydrochlorothiazide. 12/31/2018 -spironolactone and losartan.  She does not want to change her regimen.     Hypercholesteremia      Hypercholesterolemia      Lactic acidosis      Osteopenia      Osteopenia      SBO (small bowel obstruction) (H) 05/20/2020     Umbilical hernia 09/23/2021    Formatting of this note might be different from the original. 9/23/2021 - tiny asymptomatic. Found incidentally.     Umbilical hernia without obstruction and without gangrene        Patient Active Problem List    Diagnosis Date Noted     Umbilical hernia 09/23/2021     Priority: Medium     Formatting of this note might be different from the original.  9/23/2021 - tiny asymptomatic. Found incidentally.       Lactic acidosis      Priority: Medium     Dehydration      Priority: Medium     SBO (small bowel obstruction) (H) 05/20/2020     Priority: Medium     Colon polyp 12/31/2018     Priority: Medium     HTN (hypertension) 12/31/2018      Priority: Medium     Formatting of this note might be different from the original.  4/12/2021 - had side effects with hydrochlorothiazide.  3/4/2021 - losartan. Add hydrochlorothiazide.  12/31/2018 -spironolactone and losartan.  She does not want to change her regimen.       Osteopenia 12/31/2018     Priority: Medium     Formatting of this note might be different from the original.  12/31/2018 - evista for many years. Does not want bisphos.       History of colonic polyps 02/10/2017     Priority: Medium     Diverticular disease of large intestine 02/07/2017     Priority: Medium       Findings:      Operative Report:    Consent was obtained.  The patient was taken to the operating room and placed in a supine position.  General anesthesia was administered by the anesthesia staff.  The briefing and timeout were performed in the usual fashion.  The patient was prepped and draped in a sterile manner.    A briefing and timeout was performed by anesthesia and or staff.  Began the procedure by making an incision on the mid left abdomen using a 5 mm Visiport and a 5 mm scope to gain access to the peritoneal cavity.  Insufflate the space of pressure 15 mm proceed mercury CO2.  I then placed the scope through here I could see there was adhesions along the whole midline hole from the prior surgery area which the omentum and small bowel.  I placed an 8 mm robotic trocar right under the costal margin on the left side on the lateral abdominal wall and then took down adhesions I did this sharply and bluntly and they got a nice window along that whole side I then placed another 8 mm robotic trocar in the left lower abdomen on the lateral abdominal wall.  I switched out my 5 mm trocar to an 8 mm robotic trocar and then docked the robot.  At this time point I changed my position over the .  I used a sharp and blunt dissection I did or took down the remaining adhesions make sure a good hemostasis of the area and inspected it the  prior mesh was in great position in the preperitoneal space there had been, some gas ischemia to the peritoneum which would cause these adhesions but the rest of the mesh looked great was all in good position had not retracted or pulled back in a position or place.  The defect or hernia was noted superior to the mesh this was about a 3 x 3-1/2 for 3 to 4 cm defect and this was count of herniating into this was the falciform ligament.  So at this time point sharply took down the falciform ligament reduced with withdrawing it from the hernia sac itself and reducing this all internally and created a preperitoneal flap to the right side and is much as a code on the left side because of my trocars being placed in the left side.  In so doing I had good elevation of about 10 cm above the hernia underneath the xiphoid and I had good lateralization about almost 8 to 10 cm in each side.  I measured the mesh I cut a piece of 10 x 15 dual layer mesh I Yao and to a 10 x 10 x 10.  This was placed intraperitoneally along with 2 oh V-Loc suture permanent sutures in two 2-0 absorbable V-Loc sutures.  At this time point I closed the primary defect closed with an oh permanent V-Loc suture and upon completion of this I placed the mesh stopper put on the abdominal wall.  I put the sling a straight cut piece from mesh to mesh does have an overlap with 3 cm and then the mesh lied all the way up underneath the xiphoid.  I sutured this mesh continuously around the edge of the mesh to mesh and mesh to the fascia and connective tissue with a oh V-Loc permanent suture upon completion of this I reapproximated over this the falciform ligament and the preperitoneal tissue with the 2 oh V-Loc sutures this covered 90+ percent of the mesh except for the the mesh to mesh junction.  We at this time point at this time point removed all the needles and sutures was very happy with the repair and the placement of the mesh in the mckee lied.  Trocars were  removed robot was undocked and trocars removed I closed all incisions a Faiser 4-0 Monocryl subcuticular stitch.  Steri-Strip sterile dressing applied 10 Marcaine direct incisions and I turned the procedure over to the anesthesiologist for tap block.        Estimated Blood Loss:   < 10 ml    Specimens:    none       Drains:   Urethral Catheter 06/08/23 Double-lumen;Latex 16 fr (Active)       Complications:    None    Roman George MD     Date: 6/8/2023  Time: 2:30 PM

## 2023-06-08 NOTE — ANESTHESIA PROCEDURE NOTES
TAP Procedure Note    Pre-Procedure   Staff -        Anesthesiologist:  Darian Black MD       Performed By: anesthesiologist       Location: OR       Procedure Start/Stop Times: 6/8/2023 2:18 PM and 6/8/2023 2:22 PM       Pre-Anesthestic Checklist: patient identified, IV checked, site marked, risks and benefits discussed, informed consent, monitors and equipment checked, pre-op evaluation, at physician/surgeon's request and post-op pain management  Timeout:       Correct Patient: Yes        Correct Procedure: Yes        Correct Site: Yes        Correct Position: Yes        Correct Laterality: Yes        Site Marked: Yes  Procedure Documentation  Procedure: TAP       Laterality: bilateral       Patient Position: supine       Patient Prep/Sterile Barriers: sterile gloves, mask       Skin prep: Chloraprep       Needle Type: Masher Mediay needle       Needle Gauge: 20.        Needle Length (Inches): 6        Ultrasound guided       1. Ultrasound was used to identify targeted nerve, plexus, vascular marker, or fascial plane and place a needle adjacent to it in real-time.       2. Ultrasound was used to visualize the spread of anesthetic in close proximity to the above referenced structure.       3. A permanent image is entered into the patient's record.       4. The visualized anatomic structures appeared normal.       5. There were no apparent abnormal pathologic findings.    Assessment/Narrative         The placement was negative for: blood aspirated, painful injection and site bleeding       Paresthesias: No.       Bolus given via needle..        Secured via.        Insertion/Infusion Method: Single Shot       Complications: none    Medication(s) Administered   Bupivacaine 0.25% PF (Infiltration) - Infiltration   20 mL - 6/8/2023 2:18:00 PM  Bupivacaine liposome (Exparel) 1.3% LA inj susp (Infiltration) - Infiltration   20 mL - 6/8/2023 2:18:00 PM  Medication Administration Time: 6/8/2023 2:18 PM      FOR [facility]  "Bank) ONLY:   Pain Team Contact information: please page the Pain Team Via [facility]. Search \"Pain\". During daytime hours, please page the attending first. At night please page the resident first.      "

## 2023-06-08 NOTE — ANESTHESIA PREPROCEDURE EVALUATION
Anesthesia Pre-Procedure Evaluation    Patient: Mary J Bruton   MRN: [id number] : 1945        Procedure : Procedure(s):  HERNIORRHAPHY, VENTRAL, ROBOT-ASSISTED, LAPAROSCOPIC, USING DA JULIETTE XI          Past Medical History:   Diagnosis Date     Arthritis      Benign essential hypertension      Colon polyp 2018     Complication of anesthesia      Dehydration      Diverticular disease of large intestine 2017     History of colonic polyps      HTN (hypertension) 2018    Formatting of this note might be different from the original. 2021 - had side effects with hydrochlorothiazide. 3/4/2021 - losartan. Add hydrochlorothiazide. 2018 -spironolactone and losartan.  She does not want to change her regimen.     Hypercholesteremia      Hypercholesterolemia      Lactic acidosis      Osteopenia      Osteopenia      SBO (small bowel obstruction) (H) 2020     Umbilical hernia 2021    Formatting of this note might be different from the original. 2021 - tiny asymptomatic. Found incidentally.     Umbilical hernia without obstruction and without gangrene       Past Surgical History:   Procedure Laterality Date     CHOLECYSTECTOMY       DAVINCI XI HERNIORRHAPHY VENTRAL N/A 10/27/2022    Procedure: HERNIORRHAPHY, VENTRAL, ROBOT-ASSISTED, LAPAROSCOPIC WITH MESH, USING DA JULIETTE XI;  Surgeon: Roman George MD;  Location: [facility] [place]     HERNIA REPAIR       LAPAROTOMY EXPLORATORY N/A 2020    Procedure: EXPLORATORY LAPAROTOMY WITH LYSIS OF ADHESIONS;  Surgeon: Roman George MD;  Location: [facility];  Service: General     ORTHOPEDIC SURGERY        Allergies   Allergen Reactions     Atenolol Shortness Of Breath     Hydrochlorothiazide      Doesn't remember     Lisinopril Unknown     Doesn't remember      Social History     Tobacco Use     Smoking status: Former     Smokeless tobacco: Never     Tobacco comments:     started at 15 years old, stopped at 22 years  old   Vaping Use     Vaping status: Never Used   Substance Use Topics     Alcohol use: Yes     Alcohol/week: 1.0 standard drink of alcohol     Types: 1 Standard drinks or equivalent per week     Comment: 1 glass wine a week      Wt Readings from Last 1 Encounters:   06/08/23 60.5 kg (133 lb 6.4 oz)        Anesthesia Evaluation   Pt has had prior anesthetic.     History of anesthetic complications (significant saliva secretions after anesthesia)       ROS/MED HX  ENT/Pulmonary:  - neg pulmonary ROS     Neurologic:  - neg neurologic ROS     Cardiovascular:     (+) hypertension-----    METS/Exercise Tolerance: >4 METS    Hematologic:  - neg hematologic  ROS     Musculoskeletal:  - neg musculoskeletal ROS     GI/Hepatic:  - neg GI/hepatic ROS     Renal/Genitourinary:  - neg Renal ROS     Endo:  - neg endo ROS     Psychiatric/Substance Use:  - neg psychiatric ROS     Infectious Disease:  - neg infectious disease ROS     Malignancy:  - neg malignancy ROS     Other:  - neg other ROS          Physical Exam    Airway  airway exam normal      Mallampati: III   TM distance: > 3 FB   Neck ROM: full   Mouth opening: > 3 cm    Respiratory Devices and Support         Dental       (+) Multiple crowns, permanant bridges      Cardiovascular   cardiovascular exam normal          Pulmonary   pulmonary exam normal                OUTSIDE LABS:  CBC:   Lab Results   Component Value Date    WBC 9.8 05/23/2020    WBC 10.5 05/22/2020    HGB 11.4 (L) 05/23/2020    HGB 11.3 (L) 05/22/2020    HCT 34.3 (L) 05/23/2020    HCT 35.3 05/22/2020     05/23/2020     05/22/2020     BMP:   Lab Results   Component Value Date     05/23/2020     05/22/2020    POTASSIUM 3.5 05/24/2020    POTASSIUM 3.6 05/23/2020    CHLORIDE 107 05/23/2020    CHLORIDE 110 (H) 05/22/2020    CO2 26 05/23/2020    CO2 25 05/22/2020    BUN 6 (L) 05/23/2020    BUN 10 05/22/2020    CR 0.68 05/23/2020    CR 0.69 05/22/2020    GLC 93 06/08/2023      05/23/2020     COAGS:   Lab Results   Component Value Date    INR 1.02 05/21/2020     POC: No results found for: BGM, HCG, HCGS  HEPATIC:   Lab Results   Component Value Date    ALBUMIN 3.6 12/07/2018     OTHER:   Lab Results   Component Value Date    LACT 2.0 05/21/2020    BARRERA 8.3 (L) 05/23/2020    PHOS 2.8 12/07/2018    MAG 2.0 05/24/2020       Anesthesia Plan    ASA Status:  2   NPO Status:  NPO Appropriate    Anesthesia Type: General.     - Airway: ETT   Induction: Intravenous, Propofol.   Maintenance: Balanced.   Techniques and Equipment:     - Airway: Video-Laryngoscope         Consents    Anesthesia Plan(s) and associated risks, benefits, and realistic alternatives discussed. Questions answered and patient/representative(s) expressed understanding.    - Discussed:     - Discussed with:  Patient, Spouse      - Extended Intubation/Ventilatory Support Discussed: No.      - Patient is DNR/DNI Status: No    Use of blood products discussed: No .     Postoperative Care    Pain management: IV analgesics, Multi-modal analgesia, Peripheral nerve block (Single Shot).   PONV prophylaxis: Ondansetron (or other 5HT-3), Dexamethasone or Solumedrol, Droperidol or Haldol     Comments:    Other Comments: Discussed with patient possible bilateral TAP blocks for post op pain control if procedure converts to open ventral hernia repair.            Darian Black MD

## 2023-06-08 NOTE — INTERVAL H&P NOTE
I have reviewed the surgical (or preoperative) H&P that is linked to this encounter, and examined the patient. There are no significant changes.        Roman George MD  General Surgery [phone]  Vascular Surgery [phone]          Clinical Conditions Present on Arrival:  Clinically Significant Risk Factors Present on Admission

## 2023-06-23 ENCOUNTER — OFFICE VISIT (OUTPATIENT)
Dept: SURGERY | Facility: CLINIC | Age: 78
End: 2023-06-23
Payer: COMMERCIAL

## 2023-06-23 DIAGNOSIS — K43.9 VENTRAL HERNIA WITHOUT OBSTRUCTION OR GANGRENE: Primary | ICD-10-CM

## 2023-06-23 PROCEDURE — 99212 OFFICE O/P EST SF 10 MIN: CPT | Performed by: SPECIALIST

## 2023-06-23 NOTE — LETTER
6/23/2023         RE: Mary J Bruton  [address] [address]        Dear Colleague,    Thank you for referring your patient, Mary J Bruton, to the [facility] [place]. Please see a copy of my visit note below.    HPI: Pt is here for follow up of a robotic hernia repair.  She is doing well. Her appetite is good, and bowel function regular.  No fevers or chills. Ambulating without problems.       There were no vitals taken for this visit.      EXAM: This is a  78 year old WOMAN in no distress  GENERAL: Appears well  CHEST clear  CVS S1S2 NSR  ABDOMEN: Soft, non-tender. Incisions healed nicely, no recurrence. Repair intacrt  EXT: warm, moves without difficulty         Assessment/Plan:   S/P robotic ventral hernia repair    Doing well  4 weeks no heavy lifting or strenuous activity  Return as needed.    No follow-ups on file.      Roman George MD ,MD  [facility] Department of Surgery      Again, thank you for allowing me to participate in the care of your patient.        Sincerely,        Roman George MD

## 2023-06-25 NOTE — PROGRESS NOTES
HPI: Pt is here for follow up of a robotic hernia repair.  She is doing well. Her appetite is good, and bowel function regular.  No fevers or chills. Ambulating without problems.       There were no vitals taken for this visit.      EXAM: This is a  78 year old WOMAN in no distress  GENERAL: Appears well  CHEST clear  CVS S1S2 NSR  ABDOMEN: Soft, non-tender. Incisions healed nicely, no recurrence. Repair intacrt  EXT: warm, moves without difficulty         Assessment/Plan:   S/P robotic ventral hernia repair    Doing well  4 weeks no heavy lifting or strenuous activity  Return as needed.    No follow-ups on file.      Roman eGorge MD ,MD  [facility] Department of Surgery

## 2023-09-06 ENCOUNTER — TELEPHONE (OUTPATIENT)
Dept: SURGERY | Facility: CLINIC | Age: 78
End: 2023-09-06
Payer: COMMERCIAL

## 2023-09-06 NOTE — TELEPHONE ENCOUNTER
Patient calling had hernia surgery by BETTY on 06/08. She states she is now having pain most days on her right side, She would like to see him but next opening is end of September. [place] is going [place] of the country and  wants to know what she should do about  the pains. Please call Beverly.    Thank you

## 2023-09-06 NOTE — TELEPHONE ENCOUNTER
Spoke to Beverly. She states she has pain on the right side of her abdomen that is constant rated 8/10. She says it has been going on for about 6 weeks. She had a ventral hernia repair in June by Dr. George. She is having normal bowel movements. No nausea or vomiting. No fevers. She is taking a trip to [place] and would like to be seen before she leaves. I explained that Dr. George is not available for 3 weeks. I did schedule an appointment but I also recommended she see her PCP. Also, if symptoms worsen encouraged her to go to the ER. She expressed understanding.       [facility]      Lety Araujo RN  [facility]  General Surgery  [facility] [address]  [email or web address]   Office:[phone]  Employed by [facility],

## 2023-09-29 ENCOUNTER — OFFICE VISIT (OUTPATIENT)
Dept: SURGERY | Facility: CLINIC | Age: 78
End: 2023-09-29
Payer: COMMERCIAL

## 2023-09-29 VITALS — SYSTOLIC BLOOD PRESSURE: 124 MMHG | DIASTOLIC BLOOD PRESSURE: 78 MMHG | WEIGHT: 130 LBS | BODY MASS INDEX: 23.4 KG/M2

## 2023-09-29 DIAGNOSIS — K43.2 INCISIONAL HERNIA, WITHOUT OBSTRUCTION OR GANGRENE: Primary | ICD-10-CM

## 2023-09-29 PROCEDURE — 99213 OFFICE O/P EST LOW 20 MIN: CPT | Performed by: SPECIALIST

## 2023-09-29 NOTE — LETTER
9/29/2023         RE: Mary J Bruton  [address] [facility] [address]        Dear Colleague,    Thank you for referring your patient, Mary J Bruton, to the [facility]. Please see a copy of my visit note below.    [facility] Surgery Follow up    HPI:    78 year old year old female who returns for a follow up. S/p robotic ventral hernia repair. Doing well, some occasional twinge of pain. Plans trip and wants to make sure no issuesnwith travel     Allergies:Atenolol, Hydrochlorothiazide, and Lisinopril    Past Medical History:   Diagnosis Date     Arthritis      Benign essential hypertension      Colon polyp 12/31/2018     Complication of anesthesia      Dehydration      Diverticular disease of large intestine 02/07/2017     History of colonic polyps      HTN (hypertension) 12/31/2018    Formatting of this note might be different from the original. 4/12/2021 - had side effects with hydrochlorothiazide. 3/4/2021 - losartan. Add hydrochlorothiazide. 12/31/2018 -spironolactone and losartan.  She does not want to change her regimen.     Hypercholesteremia      Hypercholesterolemia      Lactic acidosis      Osteopenia      Osteopenia      SBO (small bowel obstruction) (H) 05/20/2020     Umbilical hernia 09/23/2021    Formatting of this note might be different from the original. 9/23/2021 - tiny asymptomatic. Found incidentally.     Umbilical hernia without obstruction and without gangrene        Past Surgical History:   Procedure Laterality Date     CHOLECYSTECTOMY       DAVINCI XI HERNIORRHAPHY VENTRAL N/A 10/27/2022    Procedure: HERNIORRHAPHY, VENTRAL, ROBOT-ASSISTED, LAPAROSCOPIC WITH MESH, USING DA JULIETTE XI;  Surgeon: Roman George MD;  Location: [facility] [place]     DAVINCI XI HERNIORRHAPHY VENTRAL N/A 6/8/2023    Procedure: HERNIORRHAPHY, VENTRAL, ROBOT-ASSISTED, LAPAROSCOPIC, USING DA JULIETTE XI WITH MESH, LYSIS OF ADHESIONS;  Surgeon: Roman George  MD BROOKE;  Location: [facility]     HERNIA REPAIR       LAPAROTOMY EXPLORATORY N/A 05/21/2020    Procedure: EXPLORATORY LAPAROTOMY WITH LYSIS OF ADHESIONS;  Surgeon: Roman George MD;  [place] [facility];  Service: General     ORTHOPEDIC SURGERY         CURRENT MEDS:  Current Outpatient Medications   Medication     acetaminophen (TYLENOL) 500 MG tablet     calcium carbonate-vitamin D (CALTRATE) 600-10 MG-MCG per tablet     ibuprofen (ADVIL/MOTRIN) 200 MG capsule     losartan (COZAAR) 50 MG tablet     magnesium oxide 250 mg magnesium Tab     Multiple Vitamins-Minerals (PRESERVISION AREDS 2) CAPS     raloxifene (EVISTA) 60 mg tablet     spironolactone (ALDACTONE) 25 MG tablet     No current facility-administered medications for this visit.       History reviewed. No pertinent family history.     reports that she has quit smoking. She has never used smokeless tobacco. She reports current alcohol use of about 1.0 standard drink of alcohol per week. She reports that she does not use drugs.    Review of Systems:  Negative except hernia repair in past. Otherwise twelve system of review is negative.      OBJECTIVE:  Vitals:    09/29/23 1300   BP: 124/78   Weight: 59 kg (130 lb)     Body mass index is 23.4 kg/m .    Status: doing well    EXAM:  GENERAL: This is a well-developed 78 year old female who appears her stated age  HEAD: normocephalic  HEENT: Pupils equal and reactive bilaterally  CARDIAC: RRR without murmur  CHEST/LUNG:  Clear to auscultation  ABDOMEN: Soft, nontender, nondistended, no masses  repair intact no hernia noted  NEUROLOGIC: Focally intact, nonfocal  VASCULAR: Pulses intact, symmetrical upper and lower extremities.         LABS:  Lab Results   Component Value Date    WBC 9.8 05/23/2020    HGB 11.4 05/23/2020    HCT 34.3 05/23/2020    MCV 91 05/23/2020     05/23/2020       Images:     Results  CT ABDOMEN PELVIS W (Order [id number])      suggestion  Information displayed in this  report may not trend or trigger automated decision support.     CT ABDOMEN PELVIS W  Order: [id number]  Impression    1.  Postop changes from supraumbilical ventral hernia repair. Small crescentic low-density fluid collections supraumbilical ventral wall at site of previous hernia sac likely represents postop fluid collection. If patient has elevated white count, ultrasound-guided aspiration of fluid collection could be performed to assess for infection.  2.  Otherwise stable exam.  Narrative    For Patients: As a result of the 21st Century Cures Act, medical imaging exams and procedure reports are released immediately into your electronic medical record. You may view this report before your referring provider. If you have questions, please contact your health care provider.    EXAM: CT ABDOMEN PELVIS W  LOCATION: [facility] [place]  DATE: 9/7/2023    INDICATION: Right-sided abdominal pain since hernia surgery in June.  COMPARISON: CT 05/08/2023  TECHNIQUE: CT scan of the abdomen and pelvis was performed following injection of IV contrast. Multiplanar reformats were obtained. Dose reduction techniques were used.  CONTRAST: IOPAMIDOL 300 MG/ML  ML BOTTLE: 100mL    FINDINGS:  LOWER CHEST: Respiratory motion. Clear.    HEPATOBILIARY: Cholecystectomy with no bile duct dilatation. Normal liver.    PANCREAS: Normal.    SPLEEN: Normal.    ADRENAL GLANDS: Normal.    KIDNEYS/BLADDER: Simple bilateral renal cysts do not require imaging follow-up. No urinary tract calculus nor hydronephrosis. The bladder is nondistended.    BOWEL: Diverticulosis of the colon. No acute inflammatory change. No obstruction. Normal appendix.    LYMPH NODES: No lymphadenopathy.    VASCULATURE: No aortoiliac aneurysm.    PELVIC ORGANS: No adnexal mass nor abnormal fluid collection.    MUSCULOSKELETAL: Postop changes from supraumbilical ventral hernia repair. Crescentic low dense fluid collection supraumbilical ventral wall at site  of previous hernia sac likely represents a postop fluid collection. Small fat-containing bilateral inguinal hernias. No suspicious osseous lesions.  Exam End: 09/07/23  5:15 PM    Specimen Collected: 09/07/23  5:15 PM Last Resulted: 09/07/23  5:22 PM   Received From: [facility]  Result Received: 09/29/23 12:55 PM    View Encounter        Received Information    Assessment/Plan:   S//p hernia repair earlier this year  Doing well  Plan trip and good to go  No restrictions.  Reviewed exam and ct with patient and spouse.  Answered all questions    .   No follow-ups on file.     Roman George MD ,MD  [facility] Department of Surgery      Again, thank you for allowing me to participate in the care of your patient.        Sincerely,        Roman George MD

## 2023-10-14 ENCOUNTER — HEALTH MAINTENANCE LETTER (OUTPATIENT)
Age: 78
End: 2023-10-14

## 2023-10-20 NOTE — PROGRESS NOTES
[facility] Surgery Follow up    HPI:    78 year old year old female who returns for a follow up. S/p robotic ventral hernia repair. Doing well, some occasional twinge of pain. Plans trip and wants to make sure no issuesnwith travel     Allergies:Atenolol, Hydrochlorothiazide, and Lisinopril    Past Medical History:   Diagnosis Date    Arthritis     Benign essential hypertension     Colon polyp 12/31/2018    Complication of anesthesia     Dehydration     Diverticular disease of large intestine 02/07/2017    History of colonic polyps     HTN (hypertension) 12/31/2018    Formatting of this note might be different from the original. 4/12/2021 - had side effects with hydrochlorothiazide. 3/4/2021 - losartan. Add hydrochlorothiazide. 12/31/2018 -spironolactone and losartan.  She does not want to change her regimen.    Hypercholesteremia     Hypercholesterolemia     Lactic acidosis     Osteopenia     Osteopenia     SBO (small bowel obstruction) (H) 05/20/2020    Umbilical hernia 09/23/2021    Formatting of this note might be different from the original. 9/23/2021 - tiny asymptomatic. Found incidentally.    Umbilical hernia without obstruction and without gangrene        Past Surgical History:   Procedure Laterality Date    CHOLECYSTECTOMY      DAVINCI XI HERNIORRHAPHY VENTRAL N/A 10/27/2022    Procedure: HERNIORRHAPHY, VENTRAL, ROBOT-ASSISTED, LAPAROSCOPIC WITH MESH, USING DA JULIETTE XI;  Surgeon: Roman George MD;  Location: [facility] [place]    DAVINCI XI HERNIORRHAPHY VENTRAL N/A 6/8/2023    Procedure: HERNIORRHAPHY, VENTRAL, ROBOT-ASSISTED, LAPAROSCOPIC, USING DA JULIETTE XI WITH MESH, LYSIS OF ADHESIONS;  Surgeon: Roman George MD;  Location: [facility] [place] OR    HERNIA REPAIR      LAPAROTOMY EXPLORATORY N/A 05/21/2020    Procedure: EXPLORATORY LAPAROTOMY WITH LYSIS OF ADHESIONS;  Surgeon: Roman George MD;  Location: [facility] [place];  Service: General    ORTHOPEDIC SURGERY         CURRENT MEDS:  Current  Outpatient Medications   Medication    acetaminophen (TYLENOL) 500 MG tablet    calcium carbonate-vitamin D (CALTRATE) 600-10 MG-MCG per tablet    ibuprofen (ADVIL/MOTRIN) 200 MG capsule    losartan (COZAAR) 50 MG tablet    magnesium oxide 250 mg magnesium Tab    Multiple Vitamins-Minerals (PRESERVISION AREDS 2) CAPS    raloxifene (EVISTA) 60 mg tablet    spironolactone (ALDACTONE) 25 MG tablet     No current facility-administered medications for this visit.       History reviewed. No pertinent family history.     reports that she has quit smoking. She has never used smokeless tobacco. She reports current alcohol use of about 1.0 standard drink of alcohol per week. She reports that she does not use drugs.    Review of Systems:  Negative except hernia repair in past. Otherwise twelve system of review is negative.      OBJECTIVE:  Vitals:    09/29/23 1300   BP: 124/78   Weight: 59 kg (130 lb)     Body mass index is 23.4 kg/m .    Status: doing well    EXAM:  GENERAL: This is a well-developed 78 year old female who appears her stated age  HEAD: normocephalic  HEENT: Pupils equal and reactive bilaterally  CARDIAC: RRR without murmur  CHEST/LUNG:  Clear to auscultation  ABDOMEN: Soft, nontender, nondistended, no masses  repair intact no hernia noted  NEUROLOGIC: Focally intact, nonfocal  VASCULAR: Pulses intact, symmetrical upper and lower extremities.         LABS:  Lab Results   Component Value Date    WBC 9.8 05/23/2020    HGB 11.4 05/23/2020    HCT 34.3 05/23/2020    MCV 91 05/23/2020     05/23/2020       Images:     Results  CT ABDOMEN PELVIS W (Order [id number])      suggestion  Information displayed in this report may not trend or trigger automated decision support.     CT ABDOMEN PELVIS W  Order: [id number]  Impression    1.  Postop changes from supraumbilical ventral hernia repair. Small crescentic low-density fluid collections supraumbilical ventral wall at site of previous hernia sac likely represents  postop fluid collection. If patient has elevated white count, ultrasound-guided aspiration of fluid collection could be performed to assess for infection.  2.  Otherwise stable exam.  Narrative    For Patients: As a result of the 21st Century Cures Act, medical imaging exams and procedure reports are released immediately into your electronic medical record. You may view this report before your referring provider. If you have questions, please contact your health care provider.    EXAM: CT ABDOMEN PELVIS W  LOCATION: [facility] [place]  DATE: 9/7/2023    INDICATION: Right-sided abdominal pain since hernia surgery in June.  COMPARISON: CT 05/08/2023  TECHNIQUE: CT scan of the abdomen and pelvis was performed following injection of IV contrast. Multiplanar reformats were obtained. Dose reduction techniques were used.  CONTRAST: IOPAMIDOL 300 MG/ML  ML BOTTLE: 100mL    FINDINGS:  LOWER CHEST: Respiratory motion. Clear.    HEPATOBILIARY: Cholecystectomy with no bile duct dilatation. Normal liver.    PANCREAS: Normal.    SPLEEN: Normal.    ADRENAL GLANDS: Normal.    KIDNEYS/BLADDER: Simple bilateral renal cysts do not require imaging follow-up. No urinary tract calculus nor hydronephrosis. The bladder is nondistended.    BOWEL: Diverticulosis of the colon. No acute inflammatory change. No obstruction. Normal appendix.    LYMPH NODES: No lymphadenopathy.    VASCULATURE: No aortoiliac aneurysm.    PELVIC ORGANS: No adnexal mass nor abnormal fluid collection.    MUSCULOSKELETAL: Postop changes from supraumbilical ventral hernia repair. Crescentic low dense fluid collection supraumbilical ventral wall at site of previous hernia sac likely represents a postop fluid collection. Small fat-containing bilateral inguinal hernias. No suspicious osseous lesions.  Exam End: 09/07/23  5:15 PM    Specimen Collected: 09/07/23  5:15 PM Last Resulted: 09/07/23  5:22 PM   Received From: [employer] [facility]  Result Received: 09/29/23 12:55 PM    View Encounter        Received Information    Assessment/Plan:   S//p hernia repair earlier this year  Doing well  Plan trip and good to go  No restrictions.  Reviewed exam and ct with patient and spouse.  Answered all questions    .   No follow-ups on file.     Roman George MD ,MD  [facility] Department of Surgery

## 2024-12-07 ENCOUNTER — HEALTH MAINTENANCE LETTER (OUTPATIENT)
Age: 79
End: 2024-12-07

## 2025-02-06 ENCOUNTER — ANESTHESIA (OUTPATIENT)
Dept: SURGERY | Facility: CLINIC | Age: 80
End: 2025-02-06
Payer: COMMERCIAL

## 2025-02-06 ENCOUNTER — ANESTHESIA EVENT (OUTPATIENT)
Dept: SURGERY | Facility: CLINIC | Age: 80
End: 2025-02-06
Payer: COMMERCIAL

## 2025-02-06 ENCOUNTER — APPOINTMENT (OUTPATIENT)
Dept: RADIOLOGY | Facility: CLINIC | Age: 80
End: 2025-02-06
Attending: PHYSICIAN ASSISTANT
Payer: COMMERCIAL

## 2025-02-06 ENCOUNTER — HOSPITAL ENCOUNTER (OUTPATIENT)
Facility: CLINIC | Age: 80
End: 2025-02-06
Attending: ORTHOPAEDIC SURGERY | Admitting: ORTHOPAEDIC SURGERY
Payer: COMMERCIAL

## 2025-02-06 DIAGNOSIS — Z96.611 S/P REVERSE TOTAL SHOULDER ARTHROPLASTY, RIGHT: Primary | ICD-10-CM

## 2025-02-06 LAB — HGB BLD-MCNC: 13.9 G/DL (ref 11.7–15.7)

## 2025-02-06 PROCEDURE — 250N000011 HC RX IP 250 OP 636: Performed by: PHYSICIAN ASSISTANT

## 2025-02-06 PROCEDURE — 999N000141 HC STATISTIC PRE-PROCEDURE NURSING ASSESSMENT: Performed by: ORTHOPAEDIC SURGERY

## 2025-02-06 PROCEDURE — 360N000077 HC SURGERY LEVEL 4, PER MIN: Performed by: ORTHOPAEDIC SURGERY

## 2025-02-06 PROCEDURE — 258N000003 HC RX IP 258 OP 636: Performed by: PHYSICIAN ASSISTANT

## 2025-02-06 PROCEDURE — 271N000001 HC OR GENERAL SUPPLY NON-STERILE: Performed by: ORTHOPAEDIC SURGERY

## 2025-02-06 PROCEDURE — C1776 JOINT DEVICE (IMPLANTABLE): HCPCS | Performed by: ORTHOPAEDIC SURGERY

## 2025-02-06 PROCEDURE — 250N000011 HC RX IP 250 OP 636: Performed by: ANESTHESIOLOGY

## 2025-02-06 PROCEDURE — 85018 HEMOGLOBIN: CPT | Performed by: PHYSICIAN ASSISTANT

## 2025-02-06 PROCEDURE — 73030 X-RAY EXAM OF SHOULDER: CPT | Mod: RT

## 2025-02-06 PROCEDURE — 272N000001 HC OR GENERAL SUPPLY STERILE: Performed by: ORTHOPAEDIC SURGERY

## 2025-02-06 PROCEDURE — 258N000003 HC RX IP 258 OP 636: Performed by: ANESTHESIOLOGY

## 2025-02-06 PROCEDURE — 250N000011 HC RX IP 250 OP 636: Performed by: NURSE ANESTHETIST, CERTIFIED REGISTERED

## 2025-02-06 PROCEDURE — C1713 ANCHOR/SCREW BN/BN,TIS/BN: HCPCS | Performed by: ORTHOPAEDIC SURGERY

## 2025-02-06 PROCEDURE — 36415 COLL VENOUS BLD VENIPUNCTURE: CPT | Performed by: PHYSICIAN ASSISTANT

## 2025-02-06 PROCEDURE — 250N000009 HC RX 250: Performed by: NURSE ANESTHETIST, CERTIFIED REGISTERED

## 2025-02-06 PROCEDURE — 999N000065 XR SHOULDER RIGHT PORT G/E 2 VIEWS

## 2025-02-06 PROCEDURE — 250N000011 HC RX IP 250 OP 636: Mod: JZ | Performed by: ANESTHESIOLOGY

## 2025-02-06 PROCEDURE — 250N000013 HC RX MED GY IP 250 OP 250 PS 637: Performed by: PHYSICIAN ASSISTANT

## 2025-02-06 PROCEDURE — 64415 NJX AA&/STRD BRCH PLXS IMG: CPT | Mod: XU,RT

## 2025-02-06 PROCEDURE — 258N000003 HC RX IP 258 OP 636: Performed by: NURSE ANESTHETIST, CERTIFIED REGISTERED

## 2025-02-06 PROCEDURE — 710N000010 HC RECOVERY PHASE 1, LEVEL 2, PER MIN: Performed by: ORTHOPAEDIC SURGERY

## 2025-02-06 PROCEDURE — 370N000017 HC ANESTHESIA TECHNICAL FEE, PER MIN: Performed by: ORTHOPAEDIC SURGERY

## 2025-02-06 DEVICE — SCREW PERIPHERAL 26MM: Type: IMPLANTABLE DEVICE | Site: SHOULDER | Status: FUNCTIONAL

## 2025-02-06 DEVICE — IMPLANTABLE DEVICE
Type: IMPLANTABLE DEVICE | Site: SHOULDER | Status: FUNCTIONAL
Brand: TORNIER PERFORM® REVERSED AUGMENTED GLENOID

## 2025-02-06 DEVICE — IMPLANTABLE DEVICE
Type: IMPLANTABLE DEVICE | Site: SHOULDER | Status: FUNCTIONAL
Brand: TORNIER PERFORM™ HUMERAL SYSTEM

## 2025-02-06 DEVICE — SCREW CENTRAL 6.5X30MM DWJ130: Type: IMPLANTABLE DEVICE | Site: SHOULDER | Status: FUNCTIONAL

## 2025-02-06 DEVICE — IMPLANTABLE DEVICE
Type: IMPLANTABLE DEVICE | Site: SHOULDER | Status: FUNCTIONAL
Brand: TORNIER PERFORM® REVERSED GLENOID

## 2025-02-06 DEVICE — SCREW PERIPHERAL 22MM: Type: IMPLANTABLE DEVICE | Site: SHOULDER | Status: FUNCTIONAL

## 2025-02-06 RX ORDER — BISACODYL 10 MG
10 SUPPOSITORY, RECTAL RECTAL DAILY PRN
Status: DISCONTINUED | OUTPATIENT
Start: 2025-02-09 | End: 2025-02-07 | Stop reason: HOSPADM

## 2025-02-06 RX ORDER — ONDANSETRON 2 MG/ML
4 INJECTION INTRAMUSCULAR; INTRAVENOUS EVERY 30 MIN PRN
Status: DISCONTINUED | OUTPATIENT
Start: 2025-02-06 | End: 2025-02-06 | Stop reason: HOSPADM

## 2025-02-06 RX ORDER — NALOXONE HYDROCHLORIDE 0.4 MG/ML
0.4 INJECTION, SOLUTION INTRAMUSCULAR; INTRAVENOUS; SUBCUTANEOUS
Status: DISCONTINUED | OUTPATIENT
Start: 2025-02-06 | End: 2025-02-07 | Stop reason: HOSPADM

## 2025-02-06 RX ORDER — ONDANSETRON 2 MG/ML
INJECTION INTRAMUSCULAR; INTRAVENOUS PRN
Status: DISCONTINUED | OUTPATIENT
Start: 2025-02-06 | End: 2025-02-06

## 2025-02-06 RX ORDER — ACETAMINOPHEN 325 MG/1
325-650 TABLET ORAL EVERY 6 HOURS PRN
Status: ON HOLD | COMMUNITY
End: 2025-02-07

## 2025-02-06 RX ORDER — LIDOCAINE 40 MG/G
CREAM TOPICAL
Status: DISCONTINUED | OUTPATIENT
Start: 2025-02-06 | End: 2025-02-07 | Stop reason: HOSPADM

## 2025-02-06 RX ORDER — DEXAMETHASONE SODIUM PHOSPHATE 4 MG/ML
INJECTION, SOLUTION INTRA-ARTICULAR; INTRALESIONAL; INTRAMUSCULAR; INTRAVENOUS; SOFT TISSUE PRN
Status: DISCONTINUED | OUTPATIENT
Start: 2025-02-06 | End: 2025-02-06

## 2025-02-06 RX ORDER — SODIUM CHLORIDE, SODIUM LACTATE, POTASSIUM CHLORIDE, CALCIUM CHLORIDE 600; 310; 30; 20 MG/100ML; MG/100ML; MG/100ML; MG/100ML
INJECTION, SOLUTION INTRAVENOUS CONTINUOUS
Status: DISCONTINUED | OUTPATIENT
Start: 2025-02-06 | End: 2025-02-07 | Stop reason: HOSPADM

## 2025-02-06 RX ORDER — HYDROXYZINE HYDROCHLORIDE 10 MG/1
10 TABLET, FILM COATED ORAL EVERY 6 HOURS PRN
Status: DISCONTINUED | OUTPATIENT
Start: 2025-02-06 | End: 2025-02-07 | Stop reason: HOSPADM

## 2025-02-06 RX ORDER — LIDOCAINE HYDROCHLORIDE 10 MG/ML
INJECTION, SOLUTION INFILTRATION; PERINEURAL PRN
Status: DISCONTINUED | OUTPATIENT
Start: 2025-02-06 | End: 2025-02-06

## 2025-02-06 RX ORDER — ONDANSETRON 2 MG/ML
4 INJECTION INTRAMUSCULAR; INTRAVENOUS EVERY 6 HOURS PRN
Status: DISCONTINUED | OUTPATIENT
Start: 2025-02-06 | End: 2025-02-07 | Stop reason: HOSPADM

## 2025-02-06 RX ORDER — FENTANYL CITRATE 50 UG/ML
INJECTION, SOLUTION INTRAMUSCULAR; INTRAVENOUS PRN
Status: DISCONTINUED | OUTPATIENT
Start: 2025-02-06 | End: 2025-02-06

## 2025-02-06 RX ORDER — PROPOFOL 10 MG/ML
INJECTION, EMULSION INTRAVENOUS PRN
Status: DISCONTINUED | OUTPATIENT
Start: 2025-02-06 | End: 2025-02-06

## 2025-02-06 RX ORDER — AMOXICILLIN 250 MG
1 CAPSULE ORAL 2 TIMES DAILY
Status: DISCONTINUED | OUTPATIENT
Start: 2025-02-06 | End: 2025-02-07 | Stop reason: HOSPADM

## 2025-02-06 RX ORDER — SODIUM CHLORIDE, SODIUM LACTATE, POTASSIUM CHLORIDE, CALCIUM CHLORIDE 600; 310; 30; 20 MG/100ML; MG/100ML; MG/100ML; MG/100ML
INJECTION, SOLUTION INTRAVENOUS CONTINUOUS
Status: DISCONTINUED | OUTPATIENT
Start: 2025-02-06 | End: 2025-02-06 | Stop reason: HOSPADM

## 2025-02-06 RX ORDER — POLYETHYLENE GLYCOL 3350 17 G/17G
17 POWDER, FOR SOLUTION ORAL DAILY
Status: DISCONTINUED | OUTPATIENT
Start: 2025-02-07 | End: 2025-02-07 | Stop reason: HOSPADM

## 2025-02-06 RX ORDER — BUPIVACAINE HYDROCHLORIDE 5 MG/ML
INJECTION, SOLUTION EPIDURAL; INTRACAUDAL
Status: COMPLETED | OUTPATIENT
Start: 2025-02-06 | End: 2025-02-06

## 2025-02-06 RX ORDER — CEFAZOLIN SODIUM/WATER 2 G/20 ML
2 SYRINGE (ML) INTRAVENOUS SEE ADMIN INSTRUCTIONS
Status: DISCONTINUED | OUTPATIENT
Start: 2025-02-06 | End: 2025-02-06 | Stop reason: HOSPADM

## 2025-02-06 RX ORDER — PROCHLORPERAZINE MALEATE 5 MG/1
5 TABLET ORAL EVERY 6 HOURS PRN
Status: DISCONTINUED | OUTPATIENT
Start: 2025-02-06 | End: 2025-02-07 | Stop reason: HOSPADM

## 2025-02-06 RX ORDER — ASPIRIN 325 MG
325 TABLET, DELAYED RELEASE (ENTERIC COATED) ORAL DAILY
Status: DISCONTINUED | OUTPATIENT
Start: 2025-02-06 | End: 2025-02-07 | Stop reason: HOSPADM

## 2025-02-06 RX ORDER — ACETAMINOPHEN 325 MG/1
650 TABLET ORAL EVERY 4 HOURS PRN
Qty: 100 TABLET | Refills: 0 | Status: SHIPPED | OUTPATIENT
Start: 2025-02-06

## 2025-02-06 RX ORDER — HYDROMORPHONE HCL IN WATER/PF 6 MG/30 ML
0.4 PATIENT CONTROLLED ANALGESIA SYRINGE INTRAVENOUS EVERY 5 MIN PRN
Status: DISCONTINUED | OUTPATIENT
Start: 2025-02-06 | End: 2025-02-06 | Stop reason: HOSPADM

## 2025-02-06 RX ORDER — FENTANYL CITRATE 50 UG/ML
25 INJECTION, SOLUTION INTRAMUSCULAR; INTRAVENOUS EVERY 5 MIN PRN
Status: DISCONTINUED | OUTPATIENT
Start: 2025-02-06 | End: 2025-02-06 | Stop reason: HOSPADM

## 2025-02-06 RX ORDER — ONDANSETRON 4 MG/1
4 TABLET, ORALLY DISINTEGRATING ORAL EVERY 30 MIN PRN
Status: DISCONTINUED | OUTPATIENT
Start: 2025-02-06 | End: 2025-02-06 | Stop reason: HOSPADM

## 2025-02-06 RX ORDER — ASPIRIN 325 MG
325 TABLET, DELAYED RELEASE (ENTERIC COATED) ORAL DAILY
Qty: 30 TABLET | Refills: 0 | Status: SHIPPED | OUTPATIENT
Start: 2025-02-06

## 2025-02-06 RX ORDER — FENTANYL CITRATE 50 UG/ML
50 INJECTION, SOLUTION INTRAMUSCULAR; INTRAVENOUS EVERY 5 MIN PRN
Status: DISCONTINUED | OUTPATIENT
Start: 2025-02-06 | End: 2025-02-06 | Stop reason: HOSPADM

## 2025-02-06 RX ORDER — GLYCOPYRROLATE 0.2 MG/ML
INJECTION, SOLUTION INTRAMUSCULAR; INTRAVENOUS PRN
Status: DISCONTINUED | OUTPATIENT
Start: 2025-02-06 | End: 2025-02-06

## 2025-02-06 RX ORDER — CEFAZOLIN SODIUM/WATER 2 G/20 ML
2 SYRINGE (ML) INTRAVENOUS
Status: COMPLETED | OUTPATIENT
Start: 2025-02-06 | End: 2025-02-06

## 2025-02-06 RX ORDER — HYDROMORPHONE HCL IN WATER/PF 6 MG/30 ML
0.2 PATIENT CONTROLLED ANALGESIA SYRINGE INTRAVENOUS EVERY 4 HOURS PRN
Status: DISCONTINUED | OUTPATIENT
Start: 2025-02-06 | End: 2025-02-07 | Stop reason: HOSPADM

## 2025-02-06 RX ORDER — FENTANYL CITRATE 50 UG/ML
50 INJECTION, SOLUTION INTRAMUSCULAR; INTRAVENOUS
Status: DISCONTINUED | OUTPATIENT
Start: 2025-02-06 | End: 2025-02-06 | Stop reason: HOSPADM

## 2025-02-06 RX ORDER — OXYCODONE HYDROCHLORIDE 5 MG/1
5-10 TABLET ORAL EVERY 4 HOURS PRN
Qty: 20 TABLET | Refills: 0 | Status: SHIPPED | OUTPATIENT
Start: 2025-02-06

## 2025-02-06 RX ORDER — NALOXONE HYDROCHLORIDE 0.4 MG/ML
0.2 INJECTION, SOLUTION INTRAMUSCULAR; INTRAVENOUS; SUBCUTANEOUS
Status: DISCONTINUED | OUTPATIENT
Start: 2025-02-06 | End: 2025-02-07 | Stop reason: HOSPADM

## 2025-02-06 RX ORDER — ONDANSETRON 4 MG/1
4 TABLET, ORALLY DISINTEGRATING ORAL EVERY 6 HOURS PRN
Status: DISCONTINUED | OUTPATIENT
Start: 2025-02-06 | End: 2025-02-07 | Stop reason: HOSPADM

## 2025-02-06 RX ORDER — NALOXONE HYDROCHLORIDE 0.4 MG/ML
0.1 INJECTION, SOLUTION INTRAMUSCULAR; INTRAVENOUS; SUBCUTANEOUS
Status: DISCONTINUED | OUTPATIENT
Start: 2025-02-06 | End: 2025-02-06 | Stop reason: HOSPADM

## 2025-02-06 RX ORDER — PROPOFOL 10 MG/ML
INJECTION, EMULSION INTRAVENOUS CONTINUOUS PRN
Status: DISCONTINUED | OUTPATIENT
Start: 2025-02-06 | End: 2025-02-06

## 2025-02-06 RX ORDER — LIDOCAINE 40 MG/G
CREAM TOPICAL
Status: DISCONTINUED | OUTPATIENT
Start: 2025-02-06 | End: 2025-02-06 | Stop reason: HOSPADM

## 2025-02-06 RX ORDER — HYDROMORPHONE HCL IN WATER/PF 6 MG/30 ML
0.1 PATIENT CONTROLLED ANALGESIA SYRINGE INTRAVENOUS EVERY 4 HOURS PRN
Status: DISCONTINUED | OUTPATIENT
Start: 2025-02-06 | End: 2025-02-07 | Stop reason: HOSPADM

## 2025-02-06 RX ORDER — CEFAZOLIN SODIUM 1 G/3ML
1 INJECTION, POWDER, FOR SOLUTION INTRAMUSCULAR; INTRAVENOUS EVERY 8 HOURS
Status: COMPLETED | OUTPATIENT
Start: 2025-02-06 | End: 2025-02-07

## 2025-02-06 RX ORDER — HYDROXYZINE PAMOATE 25 MG/1
25 CAPSULE ORAL 4 TIMES DAILY PRN
Qty: 40 CAPSULE | Refills: 1 | Status: SHIPPED | OUTPATIENT
Start: 2025-02-06

## 2025-02-06 RX ORDER — OXYCODONE HYDROCHLORIDE 5 MG/1
5 TABLET ORAL EVERY 4 HOURS PRN
Status: DISCONTINUED | OUTPATIENT
Start: 2025-02-06 | End: 2025-02-07 | Stop reason: HOSPADM

## 2025-02-06 RX ORDER — HYDROMORPHONE HCL IN WATER/PF 6 MG/30 ML
0.2 PATIENT CONTROLLED ANALGESIA SYRINGE INTRAVENOUS EVERY 5 MIN PRN
Status: DISCONTINUED | OUTPATIENT
Start: 2025-02-06 | End: 2025-02-06 | Stop reason: HOSPADM

## 2025-02-06 RX ORDER — DEXAMETHASONE SODIUM PHOSPHATE 4 MG/ML
4 INJECTION, SOLUTION INTRA-ARTICULAR; INTRALESIONAL; INTRAMUSCULAR; INTRAVENOUS; SOFT TISSUE
Status: DISCONTINUED | OUTPATIENT
Start: 2025-02-06 | End: 2025-02-06 | Stop reason: HOSPADM

## 2025-02-06 RX ORDER — FLUMAZENIL 0.1 MG/ML
0.2 INJECTION, SOLUTION INTRAVENOUS
Status: DISCONTINUED | OUTPATIENT
Start: 2025-02-06 | End: 2025-02-06 | Stop reason: HOSPADM

## 2025-02-06 RX ADMIN — PROPOFOL 150 MCG/KG/MIN: 10 INJECTION, EMULSION INTRAVENOUS at 15:31

## 2025-02-06 RX ADMIN — LIDOCAINE HYDROCHLORIDE 20 ML: 10 INJECTION, SOLUTION INFILTRATION; PERINEURAL at 15:28

## 2025-02-06 RX ADMIN — FENTANYL CITRATE 50 MCG: 50 INJECTION INTRAMUSCULAR; INTRAVENOUS at 16:00

## 2025-02-06 RX ADMIN — ONDANSETRON 4 MG: 2 INJECTION INTRAMUSCULAR; INTRAVENOUS at 17:00

## 2025-02-06 RX ADMIN — PHENYLEPHRINE HYDROCHLORIDE 50 MCG: 10 INJECTION INTRAVENOUS at 16:24

## 2025-02-06 RX ADMIN — Medication 2 G: at 15:24

## 2025-02-06 RX ADMIN — GLYCOPYRROLATE 0.2 MG: 0.2 INJECTION INTRAMUSCULAR; INTRAVENOUS at 15:28

## 2025-02-06 RX ADMIN — SODIUM CHLORIDE, POTASSIUM CHLORIDE, SODIUM LACTATE AND CALCIUM CHLORIDE: 600; 310; 30; 20 INJECTION, SOLUTION INTRAVENOUS at 12:47

## 2025-02-06 RX ADMIN — ASPIRIN 325 MG: 325 TABLET ORAL at 19:54

## 2025-02-06 RX ADMIN — BUPIVACAINE HYDROCHLORIDE 10 ML: 5 INJECTION, SOLUTION EPIDURAL; INTRACAUDAL; PERINEURAL at 13:32

## 2025-02-06 RX ADMIN — PROPOFOL 150 MG: 10 INJECTION, EMULSION INTRAVENOUS at 15:28

## 2025-02-06 RX ADMIN — Medication 200 MG: at 17:04

## 2025-02-06 RX ADMIN — SODIUM CHLORIDE, POTASSIUM CHLORIDE, SODIUM LACTATE AND CALCIUM CHLORIDE: 600; 310; 30; 20 INJECTION, SOLUTION INTRAVENOUS at 19:08

## 2025-02-06 RX ADMIN — ROCURONIUM BROMIDE 20 MG: 10 INJECTION, SOLUTION INTRAVENOUS at 16:31

## 2025-02-06 RX ADMIN — CEFAZOLIN 1 G: 1 INJECTION, POWDER, FOR SOLUTION INTRAMUSCULAR; INTRAVENOUS at 22:55

## 2025-02-06 RX ADMIN — FENTANYL CITRATE 50 MCG: 50 INJECTION INTRAMUSCULAR; INTRAVENOUS at 15:28

## 2025-02-06 RX ADMIN — PHENYLEPHRINE HYDROCHLORIDE 50 MCG: 10 INJECTION INTRAVENOUS at 16:39

## 2025-02-06 RX ADMIN — BUPIVACAINE 10 ML: 13.3 INJECTION, SUSPENSION, LIPOSOMAL INFILTRATION at 13:32

## 2025-02-06 RX ADMIN — DEXAMETHASONE SODIUM PHOSPHATE 4 MG: 4 INJECTION, SOLUTION INTRA-ARTICULAR; INTRALESIONAL; INTRAMUSCULAR; INTRAVENOUS; SOFT TISSUE at 15:28

## 2025-02-06 RX ADMIN — PHENYLEPHRINE HYDROCHLORIDE 0.1 MCG/KG/MIN: 10 INJECTION INTRAVENOUS at 16:39

## 2025-02-06 RX ADMIN — ROCURONIUM BROMIDE 50 MG: 10 INJECTION, SOLUTION INTRAVENOUS at 15:29

## 2025-02-06 RX ADMIN — SENNOSIDES AND DOCUSATE SODIUM 1 TABLET: 50; 8.6 TABLET ORAL at 19:55

## 2025-02-06 RX ADMIN — FENTANYL CITRATE 50 MCG: 50 INJECTION INTRAMUSCULAR; INTRAVENOUS at 13:31

## 2025-02-06 RX ADMIN — PHENYLEPHRINE HYDROCHLORIDE 50 MCG: 10 INJECTION INTRAVENOUS at 16:45

## 2025-02-06 RX ADMIN — OXYCODONE HYDROCHLORIDE 2.5 MG: 5 TABLET ORAL at 22:55

## 2025-02-06 RX ADMIN — PROPOFOL 50 MG: 10 INJECTION, EMULSION INTRAVENOUS at 15:31

## 2025-02-06 RX ADMIN — MIDAZOLAM HYDROCHLORIDE 1 MG: 1 INJECTION, SOLUTION INTRAMUSCULAR; INTRAVENOUS at 13:32

## 2025-02-06 ASSESSMENT — ACTIVITIES OF DAILY LIVING (ADL)
ADLS_ACUITY_SCORE: 20
ADLS_ACUITY_SCORE: 17
ADLS_ACUITY_SCORE: 17
ADLS_ACUITY_SCORE: 18
ADLS_ACUITY_SCORE: 18
ADLS_ACUITY_SCORE: 20
ADLS_ACUITY_SCORE: 17
ADLS_ACUITY_SCORE: 20
ADLS_ACUITY_SCORE: 17
ADLS_ACUITY_SCORE: 20
ADLS_ACUITY_SCORE: 17
ADLS_ACUITY_SCORE: 15

## 2025-02-06 NOTE — OP NOTE
SURGEON:  Cornelius Angel M.D.    ASSISTANT:  Chasity Browning PA-C  A first assistant was necessary in this case to provide traction and retraction, to assure safe and smooth progression throughout the case.    Right shoulder rotator cuff arthropathy with severe glenohumeral degeneration    POSTOPERATIVE DIAGNOSIS  Right shoulder rotator cuff arthropathy with severe glenohumeral degeneration    OPERATIONS  Right reverse total shoulder arthroplasty    ANESTHESIA  Regional block with general    COMPLICATIONS  None.    ESTIMATED BLOOD LOSS  300 ml.    TOURNIQUET TIME    IMPLANT   TORNIER REVERSE TSA, 25+3 BASEPLATE, 36 MM STANDARD GLENOSPHERE, 2+ PERFORM STEM, SYMMETRIC RETENTIVE +6 POLY, 30 MM CENTRAL SCREW WITH 22x3 AND 26 MM BASEPLATE SCREWS    None    OPERATIVE PATHOLOGY  End-stage degeneration of glenohumeral joint with deficient rotator cuff    INDICATIONS FOR PROCEDURE  Patient has long-standing right shoulder pain and degeneration.  After failure of conservative measures, the patient wished to proceed with surgery.  No guarantees or warranties were given or implied.    PROCEDURE AND FINDINGS  Beverly was positively identified and brought to the operative arena, was placed supine on the operating room table and anesthetic was induced per protocol. The right upper extremity was prepped and draped in the usual sterile fashion after beach chair positioning.  A deltopectoral incision was carried out and dissection brought down to the glenohumeral joint.  The subscapularis was reflected medially, although only a small portion remained, leaving a small cuff of intact tendon on the lesser tuberosity.  The head was exposed and debrided with a rongeur.  The humerus was exposed. A humeral cut was made in an anatomic position, in about 30 degrees of retroversion. The humeral protector plate was placed and attention directed to the glenoid.  Careful and progressive dissection was carried down to expose the joint, excising the  labrum and mobilizing the joint while protecting the axillary nerve.        The humerus was then prepared with the [employer] system, finding the center, drilling, and broaching up to size 2.      The glenoid was then addressed.  Once exposed, the glenoid was sized, a center pin placed, and then the face reamed, preserving subchondral bone where possible.  The central hole was drilled and measured and the 25+3 baseplate applied with a 30 mm central screw.  Two compression and two locking screws were then placed.  The glenosphere was then tamped into position and then screwed to secure it into place. Attention was then directed back to the humerus, and the size 2 short stem was first trialled.  We trialed multiple polys and I felt the +6 retention poly provided the best stability while still providing excellent range of motion.  These final components were then tamped into place and the joint reduced.  There was excellent range of motion and stability. The wound and joint was then thoroughly irrigated, the subscapularis repaired with multiple ethibond sutures, and skin closed in layered fashion. Sterile dressings were then applied with a sling.  All sponge and needle counts were correct at the end of the procedure.  There were no complications.    PLAN  Follow up in 1-2 weeks for wound check, radiographic check, and initiation of therapy. Likely overnight stay for pain and IV antibiotics.  No ER past neutral and no forceful adduction for 6-8 weeks.  Percocet and Vistaril p.r.n. pain.

## 2025-02-06 NOTE — ANESTHESIA PROCEDURE NOTES
Brachial plexus Procedure Note    Pre-Procedure   Staff -        Anesthesiologist:  Miguel Monson MD       Performed By: anesthesiologist       Location: pre-op       Procedure Start/Stop Times: 2/6/2025 1:32 PM and 2/6/2025 1:40 PM       Pre-Anesthestic Checklist: patient identified, IV checked, site marked, risks and benefits discussed, informed consent, monitors and equipment checked, pre-op evaluation, at physician/surgeon's request and post-op pain management  Timeout:       Correct Patient: Yes        Correct Procedure: Yes        Correct Site: Yes        Correct Position: Yes        Correct Laterality: Yes        Site Marked: Yes  Procedure Documentation  Procedure: Brachial plexus         Laterality: right       Patient Position: sitting       Patient Prep/Sterile Barriers: sterile gloves, mask       Skin prep: Chloraprep (interscalene approach).       Needle Type: insulated       Needle Gauge: 22.        Needle Length (Inches): 2        Ultrasound guided       1. Ultrasound was used to identify targeted nerve, plexus, vascular marker, or fascial plane and place a needle adjacent to it in real-time.       2. Ultrasound was used to visualize the spread of anesthetic in close proximity to the above referenced structure.       3. A permanent image is entered into the patient's record.       4. The visualized anatomic structures appeared normal.       5. There were no apparent abnormal pathologic findings.    Assessment/Narrative         The placement was negative for: blood aspirated, painful injection and site bleeding       Paresthesias: No.       Bolus given via needle..        Secured via.        Insertion/Infusion Method: Single Shot       Complications: none    Medication(s) Administered   Bupivacaine 0.5% PF (Infiltration) - Infiltration   10 mL - 2/6/2025 1:32:00 PM  Bupivacaine liposome (Exparel) 1.3% LA inj susp (Infiltration) - Infiltration   10 mL - 2/6/2025 1:32:00 PM  Medication  "Administration Time: 2/6/2025 1:32 PM      FOR [facility] ONLY:   Pain Team Contact information: please page the Pain Team Via [employer]. Search \"Pain\". During daytime hours, please page the attending first. At night please page the resident first.      "

## 2025-02-06 NOTE — ANESTHESIA PREPROCEDURE EVALUATION
Anesthesia Pre-Procedure Evaluation    Patient: Mary J Bruton   MRN: [id number] : 1945        Procedure : Procedure(s):  RIGHT REVERSE TOTAL SHOULDER ARTHROPLASTY          Past Medical History:   Diagnosis Date    Arthritis     Benign essential hypertension     Colon polyp 2018    Complication of anesthesia     Dehydration     Diverticular disease of large intestine 2017    History of colonic polyps     HTN (hypertension) 2018    Formatting of this note might be different from the original. 2021 - had side effects with hydrochlorothiazide. 3/4/2021 - losartan. Add hydrochlorothiazide. 2018 -spironolactone and losartan.  She does not want to change her regimen.    Hypercholesteremia     Hypercholesterolemia     Lactic acidosis     Osteopenia     Osteopenia     SBO (small bowel obstruction) (H) 2020    Umbilical hernia 2021    Formatting of this note might be different from the original. 2021 - tiny asymptomatic. Found incidentally.    Umbilical hernia without obstruction and without gangrene       Past Surgical History:   Procedure Laterality Date    CHOLECYSTECTOMY      DAVINCI XI HERNIORRHAPHY VENTRAL N/A 10/27/2022    Procedure: HERNIORRHAPHY, VENTRAL, ROBOT-ASSISTED, LAPAROSCOPIC WITH MESH, USING DA JULIETTE XI;  Surgeon: Roman George MD;  Location: [facility] - [place] OR    DAVINCI XI HERNIORRHAPHY VENTRAL N/A 2023    Procedure: HERNIORRHAPHY, VENTRAL, ROBOT-ASSISTED, LAPAROSCOPIC, USING DA JULIETTE XI WITH MESH, LYSIS OF ADHESIONS;  Surgeon: Roman George MD;  Location: [facility] - [place]    HERNIA REPAIR      LAPAROTOMY EXPLORATORY N/A 2020    Procedure: EXPLORATORY LAPAROTOMY WITH LYSIS OF ADHESIONS;  Surgeon: Roman George MD;  Location: [facility] OR;  Service: General    ORTHOPEDIC SURGERY        Allergies   Allergen Reactions    Atenolol Shortness Of Breath    Hydrochlorothiazide      Doesn't remember    Lisinopril Unknown     Doesn't  remember      Social History     Tobacco Use    Smoking status: Former    Smokeless tobacco: Never    Tobacco comments:     started at 15 years old, stopped at 22 years old   Substance Use Topics    Alcohol use: Yes     Alcohol/week: 1.0 standard drink of alcohol     Types: 1 Standard drinks or equivalent per week     Comment: 1 glass wine a week      Wt Readings from Last 1 Encounters:   02/06/25 57.6 kg (127 lb)        Anesthesia Evaluation   Pt has had prior anesthetic.     No history of anesthetic complications       ROS/MED HX  ENT/Pulmonary:  - neg pulmonary ROS     Neurologic:  - neg neurologic ROS     Cardiovascular:     (+)  hypertension- -   -  - -                                      METS/Exercise Tolerance:     Hematologic:  - neg hematologic  ROS     Musculoskeletal:  - neg musculoskeletal ROS     GI/Hepatic:  - neg GI/hepatic ROS     Renal/Genitourinary:  - neg Renal ROS     Endo:  - neg endo ROS     Psychiatric/Substance Use:  - neg psychiatric ROS     Infectious Disease:  - neg infectious disease ROS     Malignancy:       Other:            Physical Exam    Airway        Mallampati: II   TM distance: > 3 FB   Neck ROM: full   Mouth opening: > 3 cm    Respiratory Devices and Support         Dental           Cardiovascular   cardiovascular exam normal          Pulmonary   pulmonary exam normal                OUTSIDE LABS:  CBC:   Lab Results   Component Value Date    WBC 9.8 05/23/2020    WBC 10.5 05/22/2020    HGB 13.9 02/06/2025    HGB 11.4 (L) 05/23/2020    HCT 34.3 (L) 05/23/2020    HCT 35.3 05/22/2020     05/23/2020     05/22/2020     BMP:   Lab Results   Component Value Date     05/23/2020     05/22/2020    POTASSIUM 3.5 05/24/2020    POTASSIUM 3.6 05/23/2020    CHLORIDE 107 05/23/2020    CHLORIDE 110 (H) 05/22/2020    CO2 26 05/23/2020    CO2 25 05/22/2020    BUN 6 (L) 05/23/2020    BUN 10 05/22/2020    CR 0.68 05/23/2020    CR 0.69 05/22/2020    GLC 93 06/08/2023    GLC  "119 05/23/2020     COAGS:   Lab Results   Component Value Date    INR 1.02 05/21/2020     POC: No results found for: \"BGM\", \"HCG\", \"HCGS\"  HEPATIC:   Lab Results   Component Value Date    ALBUMIN 3.6 12/07/2018     OTHER:   Lab Results   Component Value Date    LACT 2.0 05/21/2020    BARRERA 8.3 (L) 05/23/2020    PHOS 2.8 12/07/2018    MAG 2.0 05/24/2020       Anesthesia Plan    ASA Status:  2       Anesthesia Type: General.     - Airway: ETT   Induction: Intravenous.   Maintenance: Balanced.        Consents            Postoperative Care    Pain management: IV analgesics, Oral pain medications, Peripheral nerve block (Single Shot), Multi-modal analgesia.   PONV prophylaxis: Ondansetron (or other 5HT-3), Dexamethasone or Solumedrol     Comments:    Other Comments: Plan preoperative placement of single shot interscalene nerve block with exparel for postoperative pain control per surgeon request.            Miguel Monson MD    I have reviewed the pertinent notes and labs in the chart from the past 30 days and (re)examined the patient.  Any updates or changes from those notes are reflected in this note.    Clinically Significant Risk Factors Present on Admission                   # Hypertension: Noted on problem list                        "

## 2025-02-06 NOTE — ANESTHESIA PROCEDURE NOTES
Airway       Patient location during procedure: OR       Procedure Start/Stop Times: 2/6/2025 3:30 PM  Staff -        CRNA: Kerri Pabon APRN CRNA       Performed By: CRNAIndications and Patient Condition       Indications for airway management: silvana-procedural       Induction type:intravenous       Mask difficulty assessment: 1 - vent by mask    Final Airway Details       Final airway type: endotracheal airway       Successful airway: ETT - single  Endotracheal Airway Details        ETT size (mm): 7.0       Cuffed: yes       Cuff volume (mL): 6       Successful intubation technique: direct laryngoscopy       DL Blade Type: Putnam 2       Grade View of Cords: 2       Adjucts: stylet       Position: Right       Measured from: gums/teeth       Secured at (cm): 21       Bite block used: Soft    Post intubation assessment        Placement verified by: capnometry, equal breath sounds and chest rise        Number of attempts at approach: 1       Number of other approaches attempted: 0       Secured with: commercial tube almaguer and tape       Ease of procedure: easy       Dentition: Intact and Unchanged       Dental guard used and removed. Dental Guard Type: Standard White.    Medication(s) Administered   Medication Administration Time: 2/6/2025 3:30 PM

## 2025-02-06 NOTE — ANESTHESIA CARE TRANSFER NOTE
Patient: Mary J Bruton    Procedure: Procedure(s):  RIGHT REVERSE TOTAL SHOULDER ARTHROPLASTY       Diagnosis: Pain in joint of right shoulder [M25.511]  Diagnosis Additional Information: No value filed.    Anesthesia Type:   No value filed.     Note:    Oropharynx: oropharynx clear of all foreign objects  Level of Consciousness: awake  Oxygen Supplementation: nasal cannula  Level of Supplemental Oxygen (L/min / FiO2): 3  Independent Airway: airway patency satisfactory and stable  Dentition: dentition unchanged  Vital Signs Stable: post-procedure vital signs reviewed and stable  Report to RN Given: handoff report given  Patient transferred to: PACU    Handoff Report: Identifed the Patient, Identified the Reponsible Provider, Reviewed the pertinent medical history, Discussed the surgical course, Reviewed Intra-OP anesthesia mangement and issues during anesthesia, Set expectations for post-procedure period and Allowed opportunity for questions and acknowledgement of understanding      Vitals:  Vitals Value Taken Time   /74 02/06/25 1725   Temp 36.7  C (98.06  F) 02/06/25 1729   Pulse 78 02/06/25 1729   Resp 21 02/06/25 1729   SpO2 98 % 02/06/25 1729   Vitals shown include unfiled device data.    Electronically Signed By: JOHNNY Wells CRNA  February 6, 2025  5:31 PM

## 2025-02-06 NOTE — PHARMACY-ADMISSION MEDICATION HISTORY
Pharmacist Admission Medication History    Admission medication history is complete. The information provided in this note is only as accurate as the sources available at the time of the update.    Information Source(s): Patient and CareEverywhere/SureScripts via in-person    Pertinent Information: Patient was on hydroxychloroquine for RA in her fingers. She states she stopped ~ 6 days ago as she did not see much benefit from the medication. Does not currently plan to resume post op.    Allergies reviewed with patient and updates made in EHR: yes    Medication History Completed By: Emil SousaD 2/6/2025 12:48 PM    PTA Med List   Medication Sig Last Dose/Taking    acetaminophen (TYLENOL) 325 MG tablet Take 325-650 mg by mouth every 6 hours as needed for mild pain. Unknown    calcium carbonate-vitamin D (CALTRATE) 600-10 MG-MCG per tablet Take 1 tablet by mouth 2 times daily 2/3/2025    ibuprofen (ADVIL/MOTRIN) 200 MG capsule Take 200 mg by mouth every 4 hours as needed for fever Unknown    losartan (COZAAR) 50 MG tablet Take 50 mg by mouth daily 2/5/2025 Morning    magnesium oxide 250 mg magnesium Tab [MAGNESIUM OXIDE 250 MG MAGNESIUM TAB] Take 250 mg by mouth daily. 2/3/2025    Multiple Vitamins-Minerals (PRESERVISION AREDS 2) CAPS Take 1 capsule by mouth 2 times daily 2/3/2025    raloxifene (EVISTA) 60 mg tablet [RALOXIFENE (EVISTA) 60 MG TABLET] Take 60 mg by mouth daily. 2/3/2025    spironolactone (ALDACTONE) 25 MG tablet Take 50 mg by mouth daily 2/5/2025 Morning

## 2025-02-07 ENCOUNTER — APPOINTMENT (OUTPATIENT)
Dept: OCCUPATIONAL THERAPY | Facility: CLINIC | Age: 80
End: 2025-02-07
Attending: PHYSICIAN ASSISTANT
Payer: COMMERCIAL

## 2025-02-07 VITALS
OXYGEN SATURATION: 94 % | RESPIRATION RATE: 20 BRPM | WEIGHT: 127 LBS | SYSTOLIC BLOOD PRESSURE: 127 MMHG | HEIGHT: 62 IN | HEART RATE: 74 BPM | TEMPERATURE: 98.5 F | BODY MASS INDEX: 23.37 KG/M2 | DIASTOLIC BLOOD PRESSURE: 71 MMHG

## 2025-02-07 VITALS
SYSTOLIC BLOOD PRESSURE: 111 MMHG | RESPIRATION RATE: 20 BRPM | HEART RATE: 70 BPM | HEIGHT: 62 IN | OXYGEN SATURATION: 94 % | DIASTOLIC BLOOD PRESSURE: 57 MMHG | BODY MASS INDEX: 23.37 KG/M2 | WEIGHT: 127 LBS | TEMPERATURE: 98.8 F

## 2025-02-07 LAB
FASTING STATUS PATIENT QL REPORTED: ABNORMAL
GLUCOSE SERPL-MCNC: 125 MG/DL (ref 70–99)
HGB BLD-MCNC: 12.1 G/DL (ref 11.7–15.7)

## 2025-02-07 PROCEDURE — 85018 HEMOGLOBIN: CPT | Performed by: PHYSICIAN ASSISTANT

## 2025-02-07 PROCEDURE — 250N000011 HC RX IP 250 OP 636: Performed by: PHYSICIAN ASSISTANT

## 2025-02-07 PROCEDURE — 250N000013 HC RX MED GY IP 250 OP 250 PS 637: Performed by: PHYSICIAN ASSISTANT

## 2025-02-07 PROCEDURE — 36415 COLL VENOUS BLD VENIPUNCTURE: CPT | Performed by: ORTHOPAEDIC SURGERY

## 2025-02-07 PROCEDURE — 97530 THERAPEUTIC ACTIVITIES: CPT | Mod: GO

## 2025-02-07 PROCEDURE — 97166 OT EVAL MOD COMPLEX 45 MIN: CPT | Mod: GO

## 2025-02-07 PROCEDURE — 97535 SELF CARE MNGMENT TRAINING: CPT | Mod: GO

## 2025-02-07 PROCEDURE — 82947 ASSAY GLUCOSE BLOOD QUANT: CPT | Performed by: ORTHOPAEDIC SURGERY

## 2025-02-07 RX ORDER — AMOXICILLIN 250 MG
1 CAPSULE ORAL 2 TIMES DAILY PRN
Qty: 42 TABLET | Refills: 0 | Status: SHIPPED | OUTPATIENT
Start: 2025-02-07

## 2025-02-07 RX ADMIN — CEFAZOLIN 1 G: 1 INJECTION, POWDER, FOR SOLUTION INTRAMUSCULAR; INTRAVENOUS at 06:25

## 2025-02-07 RX ADMIN — ASPIRIN 325 MG: 325 TABLET ORAL at 07:57

## 2025-02-07 ASSESSMENT — ACTIVITIES OF DAILY LIVING (ADL)
ADLS_ACUITY_SCORE: 24
ADLS_ACUITY_SCORE: 20
ADLS_ACUITY_SCORE: 25
ADLS_ACUITY_SCORE: 20
ADLS_ACUITY_SCORE: 24
ADLS_ACUITY_SCORE: 25
ADLS_ACUITY_SCORE: 20
ADLS_ACUITY_SCORE: 24
ADLS_ACUITY_SCORE: 20

## 2025-02-07 NOTE — PROGRESS NOTES
"[facility] Progress Note      Post-operative Day: 1 Day Post-Op    Procedure(s):  RIGHT REVERSE TOTAL SHOULDER ARTHROPLASTY      Subjective: Beverly is seen up resting in bedside chair,  present in room. No acute events overnight. Pain to right shoulder minimal. I starting to get sensation back to right hand. Is tolerating a regular diet with no nausea or vomiting. Voiding without difficulty and passing gas.     Pain: minimal  Chest pain, SOB:  No      Objective:  Blood pressure 127/71, pulse 74, temperature 98.5  F (36.9  C), temperature source Oral, resp. rate 20, height 1.575 m (5' 2\"), weight 57.6 kg (127 lb), SpO2 94%.    Patient Vitals for the past 24 hrs:   BP Temp Temp src Pulse Resp SpO2 Height Weight   02/07/25 0748 127/71 98.5  F (36.9  C) Oral 74 20 94 % -- --   02/07/25 0353 119/61 97.9  F (36.6  C) Oral 63 20 95 % -- --   02/07/25 0000 111/57 98.8  F (37.1  C) Oral 70 20 94 % -- --   02/06/25 2200 118/58 97.5  F (36.4  C) Oral 80 16 93 % -- --   02/06/25 2100 119/58 97.8  F (36.6  C) Oral 77 16 93 % -- --   02/06/25 2000 126/61 97.6  F (36.4  C) Oral 78 16 94 % -- --   02/06/25 1930 124/57 97.8  F (36.6  C) Axillary 77 -- 94 % -- --   02/06/25 1900 (!) 143/73 97.9  F (36.6  C) Oral 78 18 94 % -- --   02/06/25 1830 (!) 140/74 -- -- 67 21 98 % -- --   02/06/25 1820 (!) 142/80 -- -- 71 21 98 % -- --   02/06/25 1810 (!) 142/80 -- -- 68 28 97 % -- --   02/06/25 1800 133/60 98.1  F (36.7  C) -- 70 22 97 % -- --   02/06/25 1750 125/62 98.1  F (36.7  C) -- 67 21 97 % -- --   02/06/25 1740 117/57 98.1  F (36.7  C) -- 69 27 97 % -- --   02/06/25 1730 136/65 97.9  F (36.6  C) -- 73 19 99 % -- --   02/06/25 1725 (!) 153/74 98.2  F (36.8  C) -- 78 22 100 % -- --   02/06/25 1340 (!) 149/88 -- -- 76 28 99 % -- --   02/06/25 1335 (!) 162/80 -- -- 81 25 100 % -- --   02/06/25 1333 (!) 167/84 -- -- 82 27 99 % -- --   02/06/25 1223 -- 97.2  F (36.2  C) Temporal 78 -- 97 % 1.575 m (5' 2\") 57.6 kg (127 " lb)       Wt Readings from Last 4 Encounters:   02/06/25 57.6 kg (127 lb)   09/29/23 59 kg (130 lb)   06/08/23 60.5 kg (133 lb 6.4 oz)   05/05/23 59 kg (130 lb)       General: Alert and orientated, NAD  Respiratory: Non-labored breathing on RA  RUE in sling, sensation decreased due to block.  motor function, , and circulation intact   Yes, Dorsiflexion/plantarflexion intact and equal bilaterally. Moves all other extremities with ease and purpose   Wound status: incisions are clean dry and intact. Yes  Calf tenderness: Bilateral  No    Pertinent Labs   Lab Results: personally reviewed.     Recent Labs   Lab Test 02/07/25  0630 02/06/25  1248 05/23/20  0711 05/23/20  0710 05/22/20  0742 05/21/20  0703 05/21/20  0017   INR  --   --   --   --   --   --  1.02   WBC  --   --   --  9.8 10.5 18.7* 24.1*   HGB 12.1 13.9  --  11.4* 11.3* 12.9 15.7   HCT  --   --   --  34.3* 35.3 39.9 46.7   MCV  --   --   --  91 94 92 89   PLT  --   --   --  218 192 254 294   NA  --   --  139  --  141 141 141       Plan:   Anticoagulation protocol:  mg daily  x 30  days  Pain medications:  scopainmedication: oxycodone and tylenol  Weight bearing status:  NWB to RUE  Disposition:  Home today pending therapies    Continue cares and rehabilitation     Report completed by:  JOHNNY Machado CNP  Date: 2/7/2025  Time: 10:49 AM

## 2025-02-07 NOTE — PROGRESS NOTES
Patient vital signs are at baseline: Yes  Patient able to ambulate as they were prior to admission or with assist devices provided by therapies during their stay:  Yes  Patient MUST void prior to discharge:  Yes  Patient able to tolerate oral intake:  Yes  Pain has adequate pain control using Oral analgesics:  Yes  Does patient have an identified :  Yes  Has goal D/C date and time been discussed with patient:  Yes    1126 - AVS reviewed with patient and spouse. All questions answered.     Roland Glover RN, ONC

## 2025-02-07 NOTE — ANESTHESIA POSTPROCEDURE EVALUATION
Patient: Mary J Bruton    Procedure: Procedure(s):  RIGHT REVERSE TOTAL SHOULDER ARTHROPLASTY       Anesthesia Type:  No value filed.    Note:     Postop Pain Control: Uneventful            Sign Out: Well controlled pain   PONV: No   Neuro/Psych: Uneventful            Sign Out: Acceptable/Baseline neuro status   Airway/Respiratory: Uneventful            Sign Out: Acceptable/Baseline resp. status   CV/Hemodynamics: Uneventful            Sign Out: Acceptable CV status; No obvious hypovolemia; No obvious fluid overload   Other NRE: NONE   DID A NON-ROUTINE EVENT OCCUR?            Last vitals:  Vitals Value Taken Time   /74 02/06/25 1830   Temp 22.1  C (71.78  F) 02/06/25 1835   Pulse 72 02/06/25 1834   Resp 30 02/06/25 1833   SpO2 98 % 02/06/25 1834   Vitals shown include unfiled device data.    Electronically Signed By: Merle Harvey MD  February 6, 2025  6:45 PM

## 2025-02-07 NOTE — PLAN OF CARE
Problem: Adult Inpatient Plan of Care  Goal: Absence of Hospital-Acquired Illness or Injury  Intervention: Identify and Manage Fall Risk  Problem: Adult Inpatient Plan of Care  Goal: Optimal Comfort and Wellbeing  Intervention: Monitor Pain and Promote Comfort    Goal Outcome Evaluation:  Patient vital signs are at baseline: Yes  Patient able to ambulate as they were prior to admission or with assist devices provided by therapies during their stay:  Yes  Patient MUST void prior to discharge:  Yes  Patient able to tolerate oral intake:  Yes  Pain has adequate pain control using Oral analgesics:  Yes  Does patient have an identified :  Yes  Has goal D/C date and time been discussed with patient:  Yes

## 2025-02-07 NOTE — DISCHARGE SUMMARY
[facility] Discharge Summary                                  [facility]     MARY J BRUTON [id number]   Age: 79 year old  PCP: Darian Mason, [phone] 1945     Date of Admission:  2/6/2025  Date of Discharge::  2/7/2025  Discharge Provider:  JOHNNY Machado CNP    Code status:  Prior    Admission Information:  Admission Diagnosis:  Pain in joint of right shoulder [M25.511]    Post-Operative Day: 1 Day Post-Op     Reason for admission:  The patient was admitted for the following:Procedure(s) (LRB):  RIGHT REVERSE TOTAL SHOULDER ARTHROPLASTY (Right)    Active Problems:    S/P reverse total shoulder arthroplasty, right      Allergies:  Atenolol, Hydrochlorothiazide, and Lisinopril    Following the procedure noted above the patient was transferred to the post-op floor and started on:    Therapy:  physical therapy and occupational therapy  Anticoagulation Plan:  mg daily  for 30 days  Pain Management: scopainmedication: oxycodone and tylenol  Weight bearing status: Non-weight bearing to RUE     The patient was followed by Orthopedics during the inpatient treatment course:  Complications:  None  Additional consultations:  None     Pertinent Labs   Lab Results: personally reviewed.     Recent Labs   Lab Test 02/07/25  0630 02/06/25  1248 05/23/20  0711 05/23/20  0710 05/22/20  0742 05/21/20  0703 05/21/20  0017   INR  --   --   --   --   --   --  1.02   WBC  --   --   --  9.8 10.5 18.7* 24.1*   HGB 12.1 13.9  --  11.4* 11.3* 12.9 15.7   HCT  --   --   --  34.3* 35.3 39.9 46.7   MCV  --   --   --  91 94 92 89   PLT  --   --   --  218 192 254 294   NA  --   --  139  --  141 141 141          Discharge Information:  Condition at discharge: Stable  Discharge destination:  Discharged to home     Medications at discharge:  Discharge Medication List as of 2/7/2025 10:54 AM        START taking these medications    Details   aspirin (ASA) 325 MG EC tablet Take 1 tablet (325 mg) by  mouth daily., Disp-30 tablet, R-0, Local Print      hydrOXYzine joshua (VISTARIL) 25 MG capsule Take 1 capsule (25 mg) by mouth 4 times daily as needed for itching (as needed with pain medicine)., Disp-40 capsule, R-1, Local Print      oxyCODONE (ROXICODONE) 5 MG tablet Take 1-2 tablets (5-10 mg) by mouth every 4 hours as needed for moderate to severe pain., Disp-20 tablet, R-0, Local Print      senna-docusate (SENOKOT-S/PERICOLACE) 8.6-50 MG tablet Take 1 tablet by mouth 2 times daily as needed for constipation., Disp-42 tablet, R-0, E-Prescribe           CONTINUE these medications which have CHANGED    Details   acetaminophen (TYLENOL) 325 MG tablet Take 2 tablets (650 mg) by mouth every 4 hours as needed for other (mild pain)., Disp-100 tablet, R-0, Local Print           CONTINUE these medications which have NOT CHANGED    Details   calcium carbonate-vitamin D (CALTRATE) 600-10 MG-MCG per tablet Take 1 tablet by mouth 2 times daily, Historical      ibuprofen (ADVIL/MOTRIN) 200 MG capsule Take 200 mg by mouth every 4 hours as needed for fever, Historical      losartan (COZAAR) 50 MG tablet Take 50 mg by mouth daily, Historical      magnesium oxide 250 mg magnesium Tab [MAGNESIUM OXIDE 250 MG MAGNESIUM TAB] Take 250 mg by mouth daily., Historical      Multiple Vitamins-Minerals (PRESERVISION AREDS 2) CAPS Take 1 capsule by mouth 2 times daily, Historical      raloxifene (EVISTA) 60 mg tablet [RALOXIFENE (EVISTA) 60 MG TABLET] Take 60 mg by mouth daily., Historical      spironolactone (ALDACTONE) 25 MG tablet Take 50 mg by mouth daily, Historical                        Follow-Up Care:  Patient should be seen in the office in 14 days by the Orthopedic Surgeon/Physician Assistant.  Call [phone] for appointment or questions.    It was my pleasure to take care of the above patient.  JOHNNY Machado CNP

## 2025-02-07 NOTE — PROGRESS NOTES
02/07/25 0900   Appointment Info   Signing Clinician's Name / Credentials (OT) Gena Zepeda, OTR/L   Rehab Comments (OT) shoulder prec   Quick Adds   Quick Adds [facility] Auth & Certification   Living Environment   People in Home spouse   Current Living Arrangements house   Home Accessibility stairs to enter home;stairs within home   Number of Stairs, Main Entrance 3   Stair Railings, Main Entrance none   Number of Stairs, Within Home, Primary greater than 10 stairs   Stair Railings, Within Home, Primary railing on right side (ascending)   Living Environment Comments Bedroom on upper level, BR with WIS on main level   Self-Care   Usual Activity Tolerance good   Current Activity Tolerance moderate   Equipment Currently Used at Home none   Fall history within last six months no   Activity/Exercise/Self-Care Comment ind with I/ADLs at baseline;  is able to assist   General Information   Onset of Illness/Injury or Date of Surgery 02/06/25   Referring Physician Dr. Angel   Patient/Family Therapy Goal Statement (OT) to return home   Additional Occupational Profile Info/Pertinent History of Current Problem R rTSA   Existing Precautions/Restrictions shoulder;weight bearing   Left Upper Extremity (Weight-bearing Status) full weight-bearing (FWB)   Right Upper Extremity (Weight-bearing Status) non weight-bearing (NWB)   Left Lower Extremity (Weight-bearing Status) full weight-bearing (FWB)   Right Lower Extremity (Weight-bearing Status) full weight-bearing (FWB)   Cognitive Status Examination   Orientation Status orientation to person, place and time   Affect/Mental Status (Cognitive) WFL   Follows Commands WFL   Visual Perception   Visual Impairment/Limitations corrective lenses for reading   Sensory   Sensory Quick Adds right UE   Sensory Comments n/t in surgical RUE   Pain Assessment   Patient Currently in Pain No   Posture   Posture not impaired   Range of Motion Comprehensive   Comment, General Range of Motion RUE  in sling with shoulder precautions   Strength Comprehensive (MMT)   General Manual Muscle Testing (MMT) Assessment no strength deficits identified   Muscle Tone Assessment   Muscle Tone Quick Adds No deficits were identified   Coordination   Coordination Comments RUE in sling with shoulder precautions   Bed Mobility   Bed Mobility supine-sit;sit-supine   Supine-Sit [place] (Bed Mobility) supervision   Sit-Supine [place] (Bed Mobility) supervision   Transfers   Transfers bed-chair transfer;sit-stand transfer;toilet transfer   Transfer Skill: Bed to Chair/Chair to Bed   Bed-Chair [place] (Transfers) supervision   Sit-Stand Transfer   Sit-Stand [place] (Transfers) supervision   Toilet Transfer   [place] Level (Toilet Transfer) supervision   Balance   Balance Assessment no deficits identified   Activities of Daily Living   BADL Assessment/Intervention upper body dressing;lower body dressing;toileting   Upper Body Dressing Assessment/Training   [place] Level (Upper Body Dressing) contact guard assist   Lower Body Dressing Assessment/Training   [place] Level (Lower Body Dressing) supervision   Toileting   [place] Level (Toileting) supervision   Clinical Impression   Criteria for Skilled Therapeutic Interventions Met (OT) Yes, treatment indicated   OT Diagnosis decreased ADL ind   Influenced by the following impairments s/p R rTSA   OT Problem List-Impairments impacting ADL problems related to;activity tolerance impaired;post-surgical precautions   Assessment of Occupational Performance 3-5 Performance Deficits   Identified Performance Deficits dressing, toileting, showering   Planned Therapy Interventions (OT) ADL retraining   Clinical Decision Making Complexity (OT) detailed assessment/moderate complexity   Risk & Benefits of therapy have been explained evaluation/treatment results reviewed;patient;spouse/significant other   OT Total Evaluation Time   OT Eval, Moderate Complexity  Minutes (14337) 5   Therapy Certification   Medical Diagnosis R rTSA   Start of Care Date 02/07/25   Certification date from 02/07/25   Certification date to 02/07/25   [facility] Authorization Information   Condition type Initial onset (within last 3 months)   Cause of current episode Post Surgical   Nature of treatment Rehabilitative   Functional ability Minimal functional limitations   Documented POC (choose all that apply) Measurable short and long term/discharge treatment goals related to physical and functional deficits.;Frequency of treatment visits and treatment activities to address deficit areas.;Patient agrees to program participation including home program   Briefly describe symptoms post op pain   How did the symptoms start post op   Symptom impact on ADLs A little bit   Condition change since eval N/A (first visit)   Type of surgery 5-Joint Replacement   OT Goals   Therapy Frequency (OT) One time eval and treatment   OT Predicted Duration/Target Date for Goal Attainment 02/07/25   OT Goals Lower Body Dressing;Upper Body Dressing;Bed Mobility;Toilet Transfer/Toileting   OT: Upper Body Dressing within precautions;Goal Met;Completed;Supervision/stand-by assist   OT: Lower Body Dressing within precautions;Goal Met;Completed;Modified independent   OT: Bed Mobility supine to/from sitting;within precautions;Goal Met;Completed;Modified independent   OT: Toilet Transfer/Toileting Modified independent;within precautions;Goal Met;Completed   Interventions   Interventions Quick Adds Self-Care/Home Management;Therapeutic Activity   Self-Care/Home Management   Self-Care/Home Mgmt/ADL, Compensatory, Meal Prep Minutes (89558) 45   Treatment Detail/Skilled Intervention Pt sitting in chair with  present upon OT arrival and agreeable to therapy. Educ on shoulder precautions. Educ on safe FB dressing using jt tech including sling management. IND LB dressing to doff/don socks, don underwear and don pants while sitting  EOB and standing using jt tech.SBA A UB dressing to don pull over shirt and sweater, doff/don sling while sitting in chiair. Educ safe g/h using jt tech. Educ toileting. Mod I toileting with use of gb as needed. Educ on safe showering with jt tech and mesh sling. Educ on kitchen mob. All questions answered within scope of OT. Pt left sitting in chair with needs in reach. Handouts given.   Therapeutic Activities   Therapeutic Activity Minutes (47626) 10   Treatment Detail/Skilled Intervention Focus on progressing func mob to promote safety and ind with ADLs. Educ on safe bed mob and positioning. IND supine<>sit bed mob. Educ on safe transfers. IND STS, bed, chair and shower transfers. Educ on car transfers. IND ambulation ~162' in [place] and to complete ascending/descending 8 stairs with railing on L side ascending and wall on L side descending. Educ on LUE AROM elbow/wrist/hand exercsies and codmans, demo by OT d/t pt's RUE still numb. Handout given.   OT Discharge Planning   OT Plan DC OT   OT Discharge Recommendation (DC Rec) other (see comments)  (defer to ortho)   OT Rationale for DC Rec Pt near ADL baseline with adaptive technique and assist from  as needed. Defer further therapy recommendations to ortho   OT Brief overview of current status IND-SBA   OT Total Distance Amb During Session (feet) 162   Total Session Time   Timed Code Treatment Minutes 55   Total Session Time (sum of timed and untimed services) 60   [facility]                                                                                   OUTPATIENT OCCUPATIONAL THERAPY    PLAN OF TREATMENT FOR OUTPATIENT REHABILITATION   Patient's Last Name, First Name, M.I. Bruton,Mary J YOB: 1945   Provider's Name   [facility]   Medical Record No.  [id number]     Onset Date: 02/06/25 Start of Care Date: 02/07/25     Medical Diagnosis:  R rTSA               OT Diagnosis:   decreased ADL ind Certification Dates:  From: 02/07/25  To: 02/07/25     See note for plan of treatment, functional goals, and certification details.    I CERTIFY THE NEED FOR THESE SERVICES FURNISHED UNDER        THIS PLAN OF TREATMENT AND WHILE UNDER MY CARE (Physician co-signature of this document indicates review and certification of the therapy plan).

## (undated) DEVICE — DRSG GAUZE 4X4" TRAY 6939

## (undated) DEVICE — DRSG STERI STRIP 1/2X4" R1547

## (undated) DEVICE — SU WND CLOSURE V-LOC PBT SZ 0 18" GS-21 VLOCN0326

## (undated) DEVICE — DAVINCI XI DRAPE ARM 470015

## (undated) DEVICE — TUBING SMOKE EVAC PNEUMOCLEAR HIGH FLOW 0620050250

## (undated) DEVICE — DAVINCI HOT SHEARS TIP COVER  400180

## (undated) DEVICE — DAVINCI XI SEAL UNIVERSAL 5-8MM 470361

## (undated) DEVICE — BLADE SAGITTAL WIDE (SO-618) 2108-118

## (undated) DEVICE — GLOVE BIOGEL PI ORTHOPRO SZ 7.5 47675

## (undated) DEVICE — DRSG ABD TNDRSRB WET PRUF 8IN X 10IN STRL  9194A

## (undated) DEVICE — SU MONOCRYL+ 4-0 18IN PS2 UND MCP496G

## (undated) DEVICE — SYSTEM LAPAROVUE VISIBILITY LAPVUE10

## (undated) DEVICE — GLOVE UNDER INDICATOR PI SZ 7.0 LF 41670

## (undated) DEVICE — BINDER ABDOMINAL 3PANEL LG 45IN 08140345

## (undated) DEVICE — SU WND CLOSURE V-LOC 90 SZ 2-0 12" GS-21 VLOCM0315

## (undated) DEVICE — IMM KIT SHOULDER TMAX MASK FACE 7210559

## (undated) DEVICE — SUCTION MANIFOLD NEPTUNE 2 SYS 4 PORT 0702-020-000

## (undated) DEVICE — GLOVE BIOGEL PI SZ 8.0 40880

## (undated) DEVICE — ELECTRODE PATIENT RETURN ADULT L10 FT 2 PLATE CORD 0855C

## (undated) DEVICE — BONE CLEANING TIP INTERPULSE  0210-010-000

## (undated) DEVICE — SYR 50ML CATH TIP W/O NDL 309620

## (undated) DEVICE — DRAPE SHEET REV FOLD 3/4 9349

## (undated) DEVICE — SUTURE VICRYL+ 2-0 27IN CT-1 UND VCP259H

## (undated) DEVICE — GOWN IMPERVIOUS BREATHABLE SMART LG 89015

## (undated) DEVICE — BANDAGE ADH LF 1X3 ABN3100A

## (undated) DEVICE — DAVINCI XI OBTURATOR BLADELESS 8MM 470359

## (undated) DEVICE — SUCTION IRR SYSTEM W/O TIP INTERPULSE HANDPIECE 0210-100-000

## (undated) DEVICE — SUTURE VICRYL+ 0 27IN CT-1 UND VCP260H

## (undated) DEVICE — GUIDEWIRE TORNIER AEQUALIS PERFORM +  2.5X220MM DWD017

## (undated) DEVICE — SYR 50ML SLIP TIP W/O NDL 309654

## (undated) DEVICE — DRAPE U SPLIT 74X120" 29440

## (undated) DEVICE — GLOVE SURG PI ULTRA TOUCH M SZ 7 LF 42670

## (undated) DEVICE — HOOD SURG T7PLUS PEEL AWAY FACE SHIELD STRL LF 0416-801-100

## (undated) DEVICE — SOL WATER IRRIG 1000ML BOTTLE 2F7114

## (undated) DEVICE — PREP CHLORAPREP W/ORANGE TINT 10.5ML 930715

## (undated) DEVICE — ENDO SHEARS RENEW LAP ENDOCUT SCISSOR TIP 16.5MM 3142

## (undated) DEVICE — DRILL BIT PERIPHERAL SCREW 3.2MM MWJ126

## (undated) DEVICE — DAVINCI XI DRAPE COLUMN 470341

## (undated) DEVICE — DRSG ADAPTIC 3X8" 6113

## (undated) DEVICE — DAVINCI XI OBTURATOR 8MM BLADELESS LONG 470360

## (undated) DEVICE — DECANTER VIAL 2006S

## (undated) DEVICE — CATH TRAY FOLEY SURESTEP 16FR DRAIN BAG STATOCK A899916

## (undated) DEVICE — KIT PATIENT POSITIONING PIGAZZI LATEX FREE 40580

## (undated) DEVICE — HOLDER LIMB VELCRO OR 0814-1533

## (undated) DEVICE — LUBRICANT INST ELECTROLUBE EL101

## (undated) DEVICE — HOOD SURG T7 PLUS STRL LF DISP 0416-801-200

## (undated) DEVICE — NEEDLE SUTURE ANCHOR 1824-4DC

## (undated) DEVICE — PREP CHLORAPREP 26ML TINTED HI-LITE ORANGE 930815

## (undated) DEVICE — GLOVE BIOGEL NEODERM 7.5 LF 42975

## (undated) DEVICE — COTTON SQUARE 3X3 STERILE

## (undated) DEVICE — CUSTOM PACK LAP CHOLE SBA5BLCHEA

## (undated) DEVICE — BLADE KNIFE SURG 15 371115

## (undated) DEVICE — SOL NACL 0.9% IRRIG 1000ML BOTTLE 2F7124

## (undated) DEVICE — ENDO TROCAR FIRST ENTRY KII FIOS Z-THRD 05X100MM CTF03

## (undated) DEVICE — DRAPE SHEET TABLE COVER KC 42301*

## (undated) DEVICE — DRSG TEGADERM 4X4 3/4" 1626W

## (undated) DEVICE — GUIDE PIN STERILE 3X75MM

## (undated) DEVICE — SUTURE VICRYL+ 4-0 27IN SH UND VCP415H

## (undated) DEVICE — SU ETHIBOND 0 CT-1 CR 8X18" CX21D

## (undated) DEVICE — SU NDL MAYO 1824-4

## (undated) DEVICE — ADH LIQUID MASTISOL TOPICAL VIAL 2-3ML 0523-48

## (undated) DEVICE — SOL NACL 0.9% INJ 1000ML BAG 2B1324X

## (undated) DEVICE — CUSTOM PACK OPEN SHOULDER SOP5BOSHEB

## (undated) RX ORDER — DEXAMETHASONE SODIUM PHOSPHATE 10 MG/ML
INJECTION, SOLUTION INTRAMUSCULAR; INTRAVENOUS
Status: DISPENSED
Start: 2023-06-08

## (undated) RX ORDER — BUPIVACAINE HYDROCHLORIDE 2.5 MG/ML
INJECTION, SOLUTION EPIDURAL; INFILTRATION; INTRACAUDAL
Status: DISPENSED
Start: 2023-06-08

## (undated) RX ORDER — PROPOFOL 10 MG/ML
INJECTION, EMULSION INTRAVENOUS
Status: DISPENSED
Start: 2023-06-08

## (undated) RX ORDER — FENTANYL CITRATE 50 UG/ML
INJECTION, SOLUTION INTRAMUSCULAR; INTRAVENOUS
Status: DISPENSED
Start: 2023-06-08

## (undated) RX ORDER — GLYCOPYRROLATE 0.2 MG/ML
INJECTION, SOLUTION INTRAMUSCULAR; INTRAVENOUS
Status: DISPENSED
Start: 2023-06-08

## (undated) RX ORDER — FENTANYL CITRATE-0.9 % NACL/PF 10 MCG/ML
PLASTIC BAG, INJECTION (ML) INTRAVENOUS
Status: DISPENSED
Start: 2025-02-06

## (undated) RX ORDER — ONDANSETRON 2 MG/ML
INJECTION INTRAMUSCULAR; INTRAVENOUS
Status: DISPENSED
Start: 2022-10-27

## (undated) RX ORDER — FENTANYL CITRATE-0.9 % NACL/PF 10 MCG/ML
PLASTIC BAG, INJECTION (ML) INTRAVENOUS
Status: DISPENSED
Start: 2022-10-27

## (undated) RX ORDER — ONDANSETRON 2 MG/ML
INJECTION INTRAMUSCULAR; INTRAVENOUS
Status: DISPENSED
Start: 2023-06-08

## (undated) RX ORDER — LIDOCAINE HYDROCHLORIDE 10 MG/ML
INJECTION, SOLUTION EPIDURAL; INFILTRATION; INTRACAUDAL; PERINEURAL
Status: DISPENSED
Start: 2023-06-08

## (undated) RX ORDER — FENTANYL CITRATE 50 UG/ML
INJECTION, SOLUTION INTRAMUSCULAR; INTRAVENOUS
Status: DISPENSED
Start: 2022-10-27

## (undated) RX ORDER — PROPOFOL 10 MG/ML
INJECTION, EMULSION INTRAVENOUS
Status: DISPENSED
Start: 2022-10-27

## (undated) RX ORDER — FENTANYL CITRATE 50 UG/ML
INJECTION, SOLUTION INTRAMUSCULAR; INTRAVENOUS
Status: DISPENSED
Start: 2025-02-06

## (undated) RX ORDER — BUPIVACAINE HYDROCHLORIDE 2.5 MG/ML
INJECTION, SOLUTION EPIDURAL; INFILTRATION; INTRACAUDAL
Status: DISPENSED
Start: 2022-10-27